# Patient Record
Sex: FEMALE | Race: WHITE | NOT HISPANIC OR LATINO | Employment: UNEMPLOYED | URBAN - METROPOLITAN AREA
[De-identification: names, ages, dates, MRNs, and addresses within clinical notes are randomized per-mention and may not be internally consistent; named-entity substitution may affect disease eponyms.]

---

## 2018-02-15 ENCOUNTER — OFFICE VISIT CONVERTED (OUTPATIENT)
Dept: ORTHOPEDIC SURGERY | Facility: CLINIC | Age: 28
End: 2018-02-15
Attending: ORTHOPAEDIC SURGERY

## 2019-01-10 ENCOUNTER — HOSPITAL ENCOUNTER (OUTPATIENT)
Dept: GENERAL RADIOLOGY | Facility: HOSPITAL | Age: 29
Discharge: HOME | End: 2019-01-10
Attending: NURSE PRACTITIONER

## 2021-05-12 ENCOUNTER — HOSPITAL ENCOUNTER (OUTPATIENT)
Dept: URGENT CARE | Facility: CLINIC | Age: 31
Discharge: HOME | End: 2021-05-12
Attending: FAMILY MEDICINE

## 2022-03-23 ENCOUNTER — TELEPHONE (OUTPATIENT)
Dept: OBSTETRICS AND GYNECOLOGY | Facility: CLINIC | Age: 32
End: 2022-03-23

## 2022-03-23 RX ORDER — DIPHENHYDRAMINE HYDROCHLORIDE 25 MG/1
CAPSULE ORAL
Qty: 90 TABLET | Refills: 4 | Status: SHIPPED | OUTPATIENT
Start: 2022-03-23 | End: 2022-08-01

## 2022-03-23 NOTE — TELEPHONE ENCOUNTER
Patient called requesting something for nausea.  Has RGOB 4-29-22. Per protocol okay'd refill on Pyridoxine & Doxylamine

## 2022-04-27 ENCOUNTER — TELEPHONE (OUTPATIENT)
Dept: OBSTETRICS AND GYNECOLOGY | Facility: CLINIC | Age: 32
End: 2022-04-27

## 2022-04-27 NOTE — TELEPHONE ENCOUNTER
Patient called this pm.  Left message wants to make an appointment.  Returned patient call.  No answer.  Patient already has RGOB appointment on [id number] with Severiano

## 2022-05-12 ENCOUNTER — INITIAL PRENATAL (OUTPATIENT)
Dept: OBSTETRICS AND GYNECOLOGY | Facility: CLINIC | Age: 32
End: 2022-05-12

## 2022-05-12 VITALS
DIASTOLIC BLOOD PRESSURE: 67 MMHG | SYSTOLIC BLOOD PRESSURE: 110 MMHG | WEIGHT: 132.4 LBS | HEIGHT: 66 IN | BODY MASS INDEX: 21.28 KG/M2

## 2022-05-12 DIAGNOSIS — O99.320 SUBOXONE MAINTENANCE TREATMENT COMPLICATING PREGNANCY, ANTEPARTUM: ICD-10-CM

## 2022-05-12 DIAGNOSIS — Z3A.18 18 WEEKS GESTATION OF PREGNANCY: ICD-10-CM

## 2022-05-12 DIAGNOSIS — F11.20 SUBOXONE MAINTENANCE TREATMENT COMPLICATING PREGNANCY, ANTEPARTUM: ICD-10-CM

## 2022-05-12 DIAGNOSIS — Z34.82 PRENATAL CARE, SUBSEQUENT PREGNANCY IN SECOND TRIMESTER: Primary | ICD-10-CM

## 2022-05-12 PROBLEM — M51.36 DEGENERATION OF LUMBAR INTERVERTEBRAL DISC: Status: ACTIVE | Noted: 2019-03-26

## 2022-05-12 PROBLEM — M45.9 ANKYLOSING SPONDYLITIS (HCC): Status: ACTIVE | Noted: 2019-03-26

## 2022-05-12 PROBLEM — M19.90 ARTHRITIS: Status: ACTIVE | Noted: 2022-05-12

## 2022-05-12 PROBLEM — M47.816 LUMBAR SPONDYLOSIS: Status: ACTIVE | Noted: 2019-03-26

## 2022-05-12 LAB
ABO GROUP BLD: NORMAL
B-HCG UR QL: POSITIVE
BACTERIA UR QL AUTO: ABNORMAL /HPF
BASOPHILS # BLD AUTO: 0.04 10*3/MM3 (ref 0–0.2)
BASOPHILS NFR BLD AUTO: 0.5 % (ref 0–1.5)
BILIRUB UR QL STRIP: NEGATIVE
BLD GP AB SCN SERPL QL: NEGATIVE
CLARITY UR: CLEAR
COLOR UR: YELLOW
DEPRECATED RDW RBC AUTO: 41.6 FL (ref 37–54)
EOSINOPHIL # BLD AUTO: 0.1 10*3/MM3 (ref 0–0.4)
EOSINOPHIL NFR BLD AUTO: 1.3 % (ref 0.3–6.2)
ERYTHROCYTE [DISTWIDTH] IN BLOOD BY AUTOMATED COUNT: 12.6 % (ref 12.3–15.4)
EXPIRATION DATE: ABNORMAL
GLUCOSE UR STRIP-MCNC: NEGATIVE MG/DL
GLUCOSE UR STRIP-MCNC: NEGATIVE MG/DL
HBV SURFACE AG SERPL QL IA: NORMAL
HCT VFR BLD AUTO: 36.2 % (ref 34–46.6)
HCV AB SER DONR QL: NORMAL
HGB BLD-MCNC: 12.2 G/DL (ref 12–15.9)
HGB UR QL STRIP.AUTO: NEGATIVE
HIV1+2 AB SER QL: NORMAL
HYALINE CASTS UR QL AUTO: ABNORMAL /LPF
IMM GRANULOCYTES # BLD AUTO: 0.02 10*3/MM3 (ref 0–0.05)
IMM GRANULOCYTES NFR BLD AUTO: 0.3 % (ref 0–0.5)
INTERNAL NEGATIVE CONTROL: NEGATIVE
INTERNAL POSITIVE CONTROL: ABNORMAL
KETONES UR QL STRIP: NEGATIVE
LEUKOCYTE EST, POC: NEGATIVE
LEUKOCYTE ESTERASE UR QL STRIP.AUTO: NEGATIVE
LYMPHOCYTES # BLD AUTO: 1.51 10*3/MM3 (ref 0.7–3.1)
LYMPHOCYTES NFR BLD AUTO: 19.8 % (ref 19.6–45.3)
Lab: ABNORMAL
MCH RBC QN AUTO: 30.2 PG (ref 26.6–33)
MCHC RBC AUTO-ENTMCNC: 33.7 G/DL (ref 31.5–35.7)
MCV RBC AUTO: 89.6 FL (ref 79–97)
MONOCYTES # BLD AUTO: 0.33 10*3/MM3 (ref 0.1–0.9)
MONOCYTES NFR BLD AUTO: 4.3 % (ref 5–12)
NEUTROPHILS NFR BLD AUTO: 5.64 10*3/MM3 (ref 1.7–7)
NEUTROPHILS NFR BLD AUTO: 73.8 % (ref 42.7–76)
NITRITE UR QL STRIP: NEGATIVE
NITRITE UR-MCNC: NEGATIVE MG/ML
NRBC BLD AUTO-RTO: 0 /100 WBC (ref 0–0.2)
PH UR STRIP.AUTO: 8.5 [PH] (ref 5–8)
PLATELET # BLD AUTO: 210 10*3/MM3 (ref 140–450)
PMV BLD AUTO: 10.7 FL (ref 6–12)
PROT UR QL STRIP: NEGATIVE
PROT UR STRIP-MCNC: NEGATIVE MG/DL
RBC # BLD AUTO: 4.04 10*6/MM3 (ref 3.77–5.28)
RBC # UR STRIP: ABNORMAL /HPF
REF LAB TEST METHOD: ABNORMAL
RH BLD: NEGATIVE
RPR SER QL: NORMAL
SP GR UR STRIP: 1.02 (ref 1–1.03)
SQUAMOUS #/AREA URNS HPF: ABNORMAL /HPF
UROBILINOGEN UR QL STRIP: ABNORMAL
WBC # UR STRIP: ABNORMAL /HPF
WBC NRBC COR # BLD: 7.64 10*3/MM3 (ref 3.4–10.8)

## 2022-05-12 PROCEDURE — 87491 CHLMYD TRACH DNA AMP PROBE: CPT | Performed by: NURSE PRACTITIONER

## 2022-05-12 PROCEDURE — 83020 HEMOGLOBIN ELECTROPHORESIS: CPT | Performed by: NURSE PRACTITIONER

## 2022-05-12 PROCEDURE — 87624 HPV HI-RISK TYP POOLED RSLT: CPT | Performed by: NURSE PRACTITIONER

## 2022-05-12 PROCEDURE — 81001 URINALYSIS AUTO W/SCOPE: CPT | Performed by: NURSE PRACTITIONER

## 2022-05-12 PROCEDURE — 81220 CFTR GENE COM VARIANTS: CPT | Performed by: NURSE PRACTITIONER

## 2022-05-12 PROCEDURE — 81025 URINE PREGNANCY TEST: CPT | Performed by: NURSE PRACTITIONER

## 2022-05-12 PROCEDURE — 99214 OFFICE O/P EST MOD 30 MIN: CPT | Performed by: NURSE PRACTITIONER

## 2022-05-12 PROCEDURE — 87661 TRICHOMONAS VAGINALIS AMPLIF: CPT | Performed by: NURSE PRACTITIONER

## 2022-05-12 PROCEDURE — 86803 HEPATITIS C AB TEST: CPT | Performed by: NURSE PRACTITIONER

## 2022-05-12 PROCEDURE — 87086 URINE CULTURE/COLONY COUNT: CPT | Performed by: NURSE PRACTITIONER

## 2022-05-12 PROCEDURE — 87591 N.GONORRHOEAE DNA AMP PROB: CPT | Performed by: NURSE PRACTITIONER

## 2022-05-12 PROCEDURE — G0432 EIA HIV-1/HIV-2 SCREEN: HCPCS | Performed by: NURSE PRACTITIONER

## 2022-05-12 PROCEDURE — G0123 SCREEN CERV/VAG THIN LAYER: HCPCS | Performed by: NURSE PRACTITIONER

## 2022-05-12 RX ORDER — BUPRENORPHINE HYDROCHLORIDE AND NALOXONE HYDROCHLORIDE DIHYDRATE 8; 2 MG/1; MG/1
2 TABLET SUBLINGUAL DAILY
COMMUNITY
Start: 2022-05-11 | End: 2022-10-10 | Stop reason: HOSPADM

## 2022-05-12 NOTE — PROGRESS NOTES
"OB Initial Visit    CC- Here for care of current pregnancy, first visit    Subjective:  31 y.o.  presenting for her first obstetrical visit.    LMP: Patient's last menstrual period was 2021.     COMPLAINS OF: Pt has no complaints, is doing well        Reviewed and updated:  OBHx, GYNHx (STDs), PMHx, Medications, Allergies, PSHx, Social Hx, Preventative Hx (PAP), Hx of abuse/safe environment, Vaccine Hx including hx of chickenpox or vaccine, Genetic Hx (pt, FOB, both families).        Objective:  /67   Ht 167.6 cm (66\")   Wt 60.1 kg (132 lb 6.4 oz)   LMP 2021   BMI 21.37 kg/m²   General- NAD, alert and oriented, appropriate  Psych- Normal mood, good memory  Neck- No masses, no thyroid enlargement  CV- Regular rhythm, no murnurs  Resp- CTA to bases, no wheezes  Abdomen- Soft, non distended, non tender, no masses    Breast left- deferred  Breast right- deferred    External genitalia- Normal, no lesions  Urethra- Normal, no masses, non tender  Vagina- Normal, no discharge  Bladder- Normal, no masses, non tender  Cvx- Normal, no lesions, no discharge, no CMT  Uterus- Normal shape and consistency, non tender, Consistent with dates.    Adnexa- Normal, no mass, non tender    Lymphatic- No palpable neck, axillary, or groin nodes  Ext- No edema, no cyanosis    Skin- No lesions, no rashes, no acanthosis nigricans    Assessment and Plan:  18w6d  Diagnoses and all orders for this visit:    1. Prenatal care, subsequent pregnancy in second trimester (Primary)  Overview:  ANJUM finalized: 10/7/22 per LMP    Genetic testing:  Desires NIPS, CF, AFP    COVID: Recommended 22  Tdap:    Rhogam:  ?Sterilization:    Anatomy US:  FU US:    PROBLEM LIST/PLAN:   On suboxone therapy         Orders:  -     POC Urinalysis Dipstick  -     POC Pregnancy, Urine  -     Hemoglobinopathy Fractionation Cascade  -     IGP,CtNgTv,Apt HPV,rfx 16 / 18,45  -     OB Panel With HIV  -     Urinalysis With Microscopic - Urine, " Clean Catch  -     Urine Culture - Urine, Urine, Random Void  -     Cystic Fibrosis Diagnostic Study  -     Cancel: Alpha Fetoprotein, Maternal  -     INVITAE NIPS  -     US Ob Detail Fetal Anatomy Single or First Gestation; Future  -     Alpha Fetoprotein, Maternal; Future    2. 18 weeks gestation of pregnancy    3. Suboxone maintenance treatment complicating pregnancy, antepartum (HCC)  Overview:  Currently on suboxone - advised to continue current treatment        Genetic Screening:   • CF  • NIPS  • AFP only    Vaccines:   • Recommend COVID vaccine, R/B discussed    Counseling:   • Nutrition discussed, calories, activity/exercise in pregnancy  • Discussed dietary restrictions/safety food preparation in pregnancy  • Reviewed what to expect prenatal visits, office providers  • Appropriate trimester precautions provided, N/V, vag bleeding, cramping  • Questions answered    Labs:   • Prenatal labs, cultures, and PAP performed (prn), NIPS, CF, AFP    Return in about 4 weeks (around 6/9/2022) for [facility] Office, OB follow up.      Jacques Wren, APRN  05/12/2022    [facility] [address]  [facility]  [address] [address]  Dept: [phone]  Loc: [phone]

## 2022-05-14 LAB
BACTERIA SPEC AEROBE CULT: ABNORMAL
RUBV IGG SERPL IA-ACNC: <0.9 INDEX

## 2022-05-16 DIAGNOSIS — O09.899 RUBELLA NON-IMMUNE STATUS, ANTEPARTUM: Primary | ICD-10-CM

## 2022-05-16 DIAGNOSIS — O23.40 URINARY TRACT INFECTION IN MOTHER DURING PREGNANCY, ANTEPARTUM: ICD-10-CM

## 2022-05-16 DIAGNOSIS — Z28.39 RUBELLA NON-IMMUNE STATUS, ANTEPARTUM: Primary | ICD-10-CM

## 2022-05-16 LAB
HGB A MFR BLD ELPH: 97.2 % (ref 96.4–98.8)
HGB A2 MFR BLD ELPH: 2.8 % (ref 1.8–3.2)
HGB F MFR BLD ELPH: 0 % (ref 0–2)
HGB FRACT BLD-IMP: NORMAL
HGB S MFR BLD ELPH: 0 %

## 2022-05-16 RX ORDER — AMPICILLIN 500 MG/1
500 CAPSULE ORAL 4 TIMES DAILY
Qty: 28 CAPSULE | Refills: 0 | Status: SHIPPED | OUTPATIENT
Start: 2022-05-16 | End: 2022-05-23

## 2022-05-18 ENCOUNTER — HOSPITAL ENCOUNTER (OUTPATIENT)
Dept: ULTRASOUND IMAGING | Facility: HOSPITAL | Age: 32
Discharge: HOME | End: 2022-05-18
Admitting: NURSE PRACTITIONER

## 2022-05-18 DIAGNOSIS — Z34.82 PRENATAL CARE, SUBSEQUENT PREGNANCY IN SECOND TRIMESTER: ICD-10-CM

## 2022-05-18 LAB
C TRACH RRNA CVX QL NAA+PROBE: NEGATIVE
CYTOLOGIST CVX/VAG CYTO: NORMAL
CYTOLOGY CVX/VAG DOC CYTO: NORMAL
CYTOLOGY CVX/VAG DOC THIN PREP: NORMAL
DX ICD CODE: NORMAL
HIV 1 & 2 AB SER-IMP: NORMAL
HPV I/H RISK 4 DNA CVX QL PROBE+SIG AMP: NEGATIVE
N GONORRHOEA RRNA CVX QL NAA+PROBE: NEGATIVE
OTHER STN SPEC: NORMAL
STAT OF ADQ CVX/VAG CYTO-IMP: NORMAL
T VAGINALIS RRNA SPEC QL NAA+PROBE: NEGATIVE

## 2022-05-18 PROCEDURE — 76811 OB US DETAILED SNGL FETUS: CPT

## 2022-05-19 ENCOUNTER — TELEPHONE (OUTPATIENT)
Dept: OBSTETRICS AND GYNECOLOGY | Facility: CLINIC | Age: 32
End: 2022-05-19

## 2022-05-19 LAB
CFTR MUT ANL BLD/T: NORMAL
LABORATORY COMMENT REPORT: NORMAL

## 2022-05-24 ENCOUNTER — REFERRAL TRIAGE (OUTPATIENT)
Dept: LABOR AND DELIVERY | Facility: HOSPITAL | Age: 32
End: 2022-05-24

## 2022-05-24 ENCOUNTER — PATIENT OUTREACH (OUTPATIENT)
Dept: LABOR AND DELIVERY | Facility: HOSPITAL | Age: 32
End: 2022-05-24

## 2022-05-24 NOTE — OUTREACH NOTE
[facility]  Enrollment    Current Estimated Gestational Age: 20w4d    Questions/Answers    Flowsheet Row Responses   Would like to participate? No          Sara Barton RN  Maternity Nurse Navigator    5/24/2022, 12:37 EDT

## 2022-06-21 ENCOUNTER — ROUTINE PRENATAL (OUTPATIENT)
Dept: OBSTETRICS AND GYNECOLOGY | Facility: CLINIC | Age: 32
End: 2022-06-21

## 2022-06-21 VITALS — BODY MASS INDEX: 21.79 KG/M2 | SYSTOLIC BLOOD PRESSURE: 106 MMHG | WEIGHT: 135 LBS | DIASTOLIC BLOOD PRESSURE: 69 MMHG

## 2022-06-21 DIAGNOSIS — Z3A.24 24 WEEKS GESTATION OF PREGNANCY: ICD-10-CM

## 2022-06-21 DIAGNOSIS — F11.20 SUBOXONE MAINTENANCE TREATMENT COMPLICATING PREGNANCY, ANTEPARTUM: ICD-10-CM

## 2022-06-21 DIAGNOSIS — Z28.39 RUBELLA NON-IMMUNE STATUS, ANTEPARTUM: ICD-10-CM

## 2022-06-21 DIAGNOSIS — O99.320 SUBOXONE MAINTENANCE TREATMENT COMPLICATING PREGNANCY, ANTEPARTUM: ICD-10-CM

## 2022-06-21 DIAGNOSIS — Z34.82 PRENATAL CARE, SUBSEQUENT PREGNANCY IN SECOND TRIMESTER: Primary | ICD-10-CM

## 2022-06-21 DIAGNOSIS — O09.899 RUBELLA NON-IMMUNE STATUS, ANTEPARTUM: ICD-10-CM

## 2022-06-21 LAB
CLARITY, POC: CLEAR
COLOR UR: YELLOW
GLUCOSE UR STRIP-MCNC: NEGATIVE MG/DL
LEUKOCYTE EST, POC: NEGATIVE
NITRITE UR-MCNC: NEGATIVE MG/ML
PROT UR STRIP-MCNC: NEGATIVE MG/DL

## 2022-06-21 PROCEDURE — 99213 OFFICE O/P EST LOW 20 MIN: CPT | Performed by: NURSE PRACTITIONER

## 2022-06-21 RX ORDER — MIRTAZAPINE 15 MG/1
TABLET, FILM COATED ORAL
COMMUNITY
Start: 2022-06-15 | End: 2022-08-01

## 2022-06-21 NOTE — ASSESSMENT & PLAN NOTE
Doing well, no complaints.  States only wants to see a female provider, explained our practice is unable to guarantee a female provider for her care.  Patient will explore transferring care, contact information for two all-female OB/GYN practices in [place] provided.

## 2022-06-21 NOTE — PROGRESS NOTES
OB FOLLOW UP        Chief Complaint   Patient presents with   • Routine Prenatal Visit       Subjective:   • No complaints   • Only wants a female provider    Objective:  /69   Wt 61.2 kg (135 lb)   LMP 12/31/2021   BMI 21.79 kg/m²   Uterine Size: size equals dates  FHT: 110-160 BPM    See OB flow for LE edema, cvx exam if performed, and Upro/Uglu    Assessment and Plan:  24w4d  Reassuring pregnancy progress.  Questions answered.  Diagnoses and all orders for this visit:    1. Prenatal care, subsequent pregnancy in second trimester (Primary)  Overview:  ANJUM finalized: 10/7/22 per LMP, 19-week US and ACOG    Genetic testing:  CF - negative, NIPS negative, missed AFP window    COVID: Recommended 5/12/22  Tdap:    Rhogam:  ?Sterilization:    Anatomy US: 5/18/22 normal anatomy, anterior placenta  FU US:    PROBLEM LIST/PLAN:   On suboxone therapy         Assessment & Plan:  Doing well, no complaints.  States only wants to see a female provider, explained our practice is unable to guarantee a female provider for her care.  Patient will explore transferring care, contact information for two all-female OB/GYN practices in [place] provided.     Orders:  -     POC Urinalysis Dipstick    2. 24 weeks gestation of pregnancy    3. Suboxone maintenance treatment complicating pregnancy, antepartum (HCC)  Overview:  Currently on suboxone - advised to continue current treatment      4. Rubella non-immune status, antepartum    Counseling:    • Second trimester precautions  • OB precautions, leaking, VB, viet jackson vs PTL/Labor  • FKC  • Patient to explore transferring care  • Continue PNV.  Importance of healthy eating and exercise.    Return in about 3 weeks (around 7/12/2022) for OB follow up, 1hGTT.            Jacques Wren, APRN  06/21/2022    [facility] [address]  [facility]  [address] [address]  Dept: [phone]  Loc: [phone]

## 2022-07-18 PROBLEM — O26.899 RH NEGATIVE, ANTEPARTUM: Status: ACTIVE | Noted: 2022-07-18

## 2022-07-18 PROBLEM — Z67.91 RH NEGATIVE, ANTEPARTUM: Status: ACTIVE | Noted: 2022-07-18

## 2022-07-20 ENCOUNTER — ROUTINE PRENATAL (OUTPATIENT)
Dept: OBSTETRICS AND GYNECOLOGY | Facility: CLINIC | Age: 32
End: 2022-07-20

## 2022-07-20 VITALS — SYSTOLIC BLOOD PRESSURE: 102 MMHG | DIASTOLIC BLOOD PRESSURE: 64 MMHG | BODY MASS INDEX: 21.95 KG/M2 | WEIGHT: 136 LBS

## 2022-07-20 DIAGNOSIS — Z67.91 RH NEGATIVE, ANTEPARTUM: ICD-10-CM

## 2022-07-20 DIAGNOSIS — O99.320 SUBOXONE MAINTENANCE TREATMENT COMPLICATING PREGNANCY, ANTEPARTUM: ICD-10-CM

## 2022-07-20 DIAGNOSIS — F11.20 SUBOXONE MAINTENANCE TREATMENT COMPLICATING PREGNANCY, ANTEPARTUM: ICD-10-CM

## 2022-07-20 DIAGNOSIS — Z34.80 SUPERVISION OF OTHER NORMAL PREGNANCY, ANTEPARTUM: Primary | ICD-10-CM

## 2022-07-20 DIAGNOSIS — O26.899 RH NEGATIVE, ANTEPARTUM: ICD-10-CM

## 2022-07-20 LAB
DEPRECATED RDW RBC AUTO: 39.1 FL (ref 37–54)
ERYTHROCYTE [DISTWIDTH] IN BLOOD BY AUTOMATED COUNT: 12 % (ref 12.3–15.4)
GLUCOSE 1H P GLC SERPL-MCNC: 115 MG/DL (ref 65–139)
GLUCOSE UR STRIP-MCNC: NEGATIVE MG/DL
HCT VFR BLD AUTO: 32 % (ref 34–46.6)
HGB BLD-MCNC: 10.7 G/DL (ref 12–15.9)
MCH RBC QN AUTO: 29.9 PG (ref 26.6–33)
MCHC RBC AUTO-ENTMCNC: 33.4 G/DL (ref 31.5–35.7)
MCV RBC AUTO: 89.4 FL (ref 79–97)
PLATELET # BLD AUTO: 174 10*3/MM3 (ref 140–450)
PMV BLD AUTO: 9.3 FL (ref 6–12)
PROT UR STRIP-MCNC: NEGATIVE MG/DL
RBC # BLD AUTO: 3.58 10*6/MM3 (ref 3.77–5.28)
WBC NRBC COR # BLD: 5.67 10*3/MM3 (ref 3.4–10.8)

## 2022-07-20 PROCEDURE — 82950 GLUCOSE TEST: CPT | Performed by: OBSTETRICS & GYNECOLOGY

## 2022-07-20 PROCEDURE — 85027 COMPLETE CBC AUTOMATED: CPT | Performed by: OBSTETRICS & GYNECOLOGY

## 2022-07-20 PROCEDURE — 99213 OFFICE O/P EST LOW 20 MIN: CPT | Performed by: OBSTETRICS & GYNECOLOGY

## 2022-07-20 NOTE — PROGRESS NOTES
Chief Complaint:  Scheduled OB visit    HPI: 31 y.o.  at 28w5d   Positive baby movement  On Suboxone split dosage daily.  Currently 4 mg 2-3 times a day.  The tablet is an 8 mg tablet.    Vitals:    22 1037   BP: 102/64   Weight: 61.7 kg (136 lb)       See OB flowsheet also for pregnancy related data.    A/P  Intrauterine pregnancy at 28w5d   Diagnoses and all orders for this visit:    1. Supervision of other normal pregnancy, antepartum (Primary)  -     POC Urinalysis Dipstick  -     Gestational Diabetes Screen 1 Hour  -     CBC (No Diff)    2. Rh negative, antepartum  -     Ambulatory Referral to [facility] For Infusion Treatment  -      RhIg Evaluation  -     Doses of rh immune globulin    3. Suboxone maintenance treatment complicating pregnancy, antepartum (HCC)  Overview:  Currently on suboxone - advised to continue current treatment    Orders:  -     US Ob 14 + Weeks Single or First Gestation; Future      Continue prenatal vitamins.      PLAN:   Return in about 2 weeks (around 8/3/2022).    Sen Downey Sr., MD  2022 12:13 EDT

## 2022-07-21 ENCOUNTER — HOSPITAL ENCOUNTER (OUTPATIENT)
Dept: INFUSION THERAPY | Facility: HOSPITAL | Age: 32
Discharge: HOME | End: 2022-07-21

## 2022-07-21 ENCOUNTER — LAB (OUTPATIENT)
Dept: LAB | Facility: HOSPITAL | Age: 32
End: 2022-07-21

## 2022-07-21 VITALS
SYSTOLIC BLOOD PRESSURE: 106 MMHG | RESPIRATION RATE: 18 BRPM | WEIGHT: 138.01 LBS | DIASTOLIC BLOOD PRESSURE: 68 MMHG | BODY MASS INDEX: 25.4 KG/M2 | OXYGEN SATURATION: 99 % | HEIGHT: 62 IN | TEMPERATURE: 99 F | HEART RATE: 82 BPM

## 2022-07-21 DIAGNOSIS — O26.899 RH NEGATIVE, ANTEPARTUM: Primary | ICD-10-CM

## 2022-07-21 DIAGNOSIS — Z67.91 RH NEGATIVE, ANTEPARTUM: Primary | ICD-10-CM

## 2022-07-21 LAB
ABO GROUP BLD: NORMAL
BLD GP AB SCN SERPL QL: NEGATIVE
NUMBER OF DOSES: NORMAL
RH BLD: NEGATIVE

## 2022-07-21 PROCEDURE — 25010000002 RHO D IMMUNE GLOBULIN 1500 UNIT/2ML SOLUTION PREFILLED SYRINGE: Performed by: OBSTETRICS & GYNECOLOGY

## 2022-07-21 PROCEDURE — 86901 BLOOD TYPING SEROLOGIC RH(D): CPT | Performed by: OBSTETRICS & GYNECOLOGY

## 2022-07-21 PROCEDURE — 86900 BLOOD TYPING SEROLOGIC ABO: CPT | Performed by: OBSTETRICS & GYNECOLOGY

## 2022-07-21 PROCEDURE — 86850 RBC ANTIBODY SCREEN: CPT | Performed by: OBSTETRICS & GYNECOLOGY

## 2022-07-21 PROCEDURE — 96372 THER/PROPH/DIAG INJ SC/IM: CPT

## 2022-07-21 RX ADMIN — HUMAN RHO(D) IMMUNE GLOBULIN 1500 UNITS: 1500 SOLUTION INTRAMUSCULAR; INTRAVENOUS at 14:28

## 2022-07-21 NOTE — CODE DOCUMENTATION
Due 9/17/22     30 weeks 2 days  Per patient       AB negative per todays lab    Eugene chang    Patient tolerated well.  Denies s/s reaction  Site wnl    Eugene rn

## 2022-08-03 ENCOUNTER — ROUTINE PRENATAL (OUTPATIENT)
Dept: OBSTETRICS AND GYNECOLOGY | Facility: CLINIC | Age: 32
End: 2022-08-03

## 2022-08-03 VITALS — SYSTOLIC BLOOD PRESSURE: 108 MMHG | DIASTOLIC BLOOD PRESSURE: 70 MMHG | WEIGHT: 142 LBS | BODY MASS INDEX: 25.97 KG/M2

## 2022-08-03 DIAGNOSIS — Z34.80 SUPERVISION OF OTHER NORMAL PREGNANCY, ANTEPARTUM: Primary | ICD-10-CM

## 2022-08-03 LAB
GLUCOSE UR STRIP-MCNC: NEGATIVE MG/DL
PROT UR STRIP-MCNC: NEGATIVE MG/DL

## 2022-08-03 PROCEDURE — 99213 OFFICE O/P EST LOW 20 MIN: CPT | Performed by: OBSTETRICS & GYNECOLOGY

## 2022-08-03 NOTE — PROGRESS NOTES
Chief Complaint:  Scheduled OB visit    HPI: 31 y.o.  at 32w1d   Positive baby movement  Suboxone twice daily as needed    Vitals:    22 1257   BP: 108/70   Weight: 64.4 kg (142 lb)     Ultrasound today notes 4 pounds 0 ounces.  25th percentile.  Predicts 6 pounds 11 ounces at 39th week    See OB flowsheet also for pregnancy related data.    A/P  Intrauterine pregnancy at 32w1d   Diagnoses and all orders for this visit:    1. Supervision of other normal pregnancy, antepartum (Primary)  -     POC Urinalysis Dipstick        Continue prenatal vitamins.  Encouraged fetal kick counts, 10 movements in 2 hours every day.  To labor and delivery if lack fetal movement    PLAN:   Return in about 2 weeks (around 2022).    Sen Downey Sr., MD  8/3/2022 13:14 EDT

## 2022-08-08 ENCOUNTER — TELEPHONE (OUTPATIENT)
Dept: OBSTETRICS AND GYNECOLOGY | Facility: CLINIC | Age: 32
End: 2022-08-08

## 2022-08-08 DIAGNOSIS — Z3A.32 32 WEEKS GESTATION OF PREGNANCY: Primary | ICD-10-CM

## 2022-08-08 DIAGNOSIS — Z3A.27 27 WEEKS GESTATION OF PREGNANCY: ICD-10-CM

## 2022-08-08 RX ORDER — PNV NO.95/FERROUS FUM/FOLIC AC 28MG-0.8MG
1 TABLET ORAL DAILY
Qty: 30 TABLET | Refills: 1 | Status: SHIPPED | OUTPATIENT
Start: 2022-08-08

## 2022-08-17 ENCOUNTER — ROUTINE PRENATAL (OUTPATIENT)
Dept: OBSTETRICS AND GYNECOLOGY | Facility: CLINIC | Age: 32
End: 2022-08-17

## 2022-08-17 VITALS — SYSTOLIC BLOOD PRESSURE: 103 MMHG | BODY MASS INDEX: 26.52 KG/M2 | DIASTOLIC BLOOD PRESSURE: 69 MMHG | WEIGHT: 145 LBS

## 2022-08-17 DIAGNOSIS — Z34.80 SUPERVISION OF OTHER NORMAL PREGNANCY, ANTEPARTUM: Primary | ICD-10-CM

## 2022-08-17 DIAGNOSIS — O99.320 SUBOXONE MAINTENANCE TREATMENT COMPLICATING PREGNANCY, ANTEPARTUM: ICD-10-CM

## 2022-08-17 DIAGNOSIS — F11.20 SUBOXONE MAINTENANCE TREATMENT COMPLICATING PREGNANCY, ANTEPARTUM: ICD-10-CM

## 2022-08-17 LAB
GLUCOSE UR STRIP-MCNC: NEGATIVE MG/DL
PROT UR STRIP-MCNC: NEGATIVE MG/DL

## 2022-08-17 PROCEDURE — 99213 OFFICE O/P EST LOW 20 MIN: CPT | Performed by: OBSTETRICS & GYNECOLOGY

## 2022-08-17 NOTE — PROGRESS NOTES
Chief Complaint:  Scheduled OB visit    HPI: 31 y.o.  at 34w1d   Positive baby movement  Patient has 30-day prescription for Suboxone.  Will bring to the hospital for verification.    Vitals:    22 0904   BP: 103/69   Weight: 65.8 kg (145 lb)       See OB flowsheet also for pregnancy related data.    A/P  Intrauterine pregnancy at 34w1d   Diagnoses and all orders for this visit:    1. Supervision of other normal pregnancy, antepartum (Primary)  -     POC Urinalysis Dipstick    2. Suboxone maintenance treatment complicating pregnancy, antepartum (HCC)  Overview:  Currently on suboxone - advised to continue current treatment    Continue Suboxone twice daily.    Continue prenatal vitamins.  Encouraged fetal kick counts, 10 movements in 2 hours every day.  To labor and delivery if lack fetal movement    PLAN:   Return in about 2 weeks (around 2022).    Sen Downey Sr., MD  2022 09:34 EDT

## 2022-09-01 ENCOUNTER — ROUTINE PRENATAL (OUTPATIENT)
Dept: OBSTETRICS AND GYNECOLOGY | Facility: CLINIC | Age: 32
End: 2022-09-01

## 2022-09-01 VITALS — BODY MASS INDEX: 26.3 KG/M2 | DIASTOLIC BLOOD PRESSURE: 74 MMHG | SYSTOLIC BLOOD PRESSURE: 120 MMHG | WEIGHT: 143.8 LBS

## 2022-09-01 DIAGNOSIS — F11.20 SUBOXONE MAINTENANCE TREATMENT COMPLICATING PREGNANCY, ANTEPARTUM: ICD-10-CM

## 2022-09-01 DIAGNOSIS — O99.320 SUBOXONE MAINTENANCE TREATMENT COMPLICATING PREGNANCY, ANTEPARTUM: ICD-10-CM

## 2022-09-01 DIAGNOSIS — Z34.80 SUPERVISION OF OTHER NORMAL PREGNANCY, ANTEPARTUM: Primary | ICD-10-CM

## 2022-09-01 LAB
GLUCOSE UR STRIP-MCNC: NEGATIVE MG/DL
PROT UR STRIP-MCNC: NEGATIVE MG/DL

## 2022-09-01 PROCEDURE — 87081 CULTURE SCREEN ONLY: CPT | Performed by: OBSTETRICS & GYNECOLOGY

## 2022-09-01 PROCEDURE — 99213 OFFICE O/P EST LOW 20 MIN: CPT | Performed by: OBSTETRICS & GYNECOLOGY

## 2022-09-01 NOTE — PROGRESS NOTES
Chief Complaint:  Scheduled OB visit    HPI: 31 y.o.  at 36w2d   Positive baby movement  Patient has 30-day Suboxone subscription.  Will bring to hospital.    Vitals:    22 0955   BP: 120/74   Weight: 65.2 kg (143 lb 12.8 oz)     Cervix closed.  Vertex presentation  See OB flowsheet also for pregnancy related data.    A/P  Intrauterine pregnancy at 36w2d   Diagnoses and all orders for this visit:    1. Supervision of other normal pregnancy, antepartum (Primary)  -     POC Urinalysis Dipstick    2. Suboxone maintenance treatment complicating pregnancy, antepartum (HCC)  Overview:  Currently on suboxone - advised to continue current treatment        Continue prenatal vitamins.  Encouraged fetal kick counts, 10 movements in 2 hours every day.  To labor and delivery if lack fetal movement  Routine labor warnings were discussed and indications for L & D f/u including bleeding, regular contractions, decreased fetal movement or/and rupture of membranes.     PLAN:   Return in about 1 week (around 2022).    Sen Downey Sr., MD  2022 10:08 EDT

## 2022-09-03 LAB — BACTERIA SPEC AEROBE CULT: NORMAL

## 2022-09-08 ENCOUNTER — ROUTINE PRENATAL (OUTPATIENT)
Dept: OBSTETRICS AND GYNECOLOGY | Facility: CLINIC | Age: 32
End: 2022-09-08

## 2022-09-08 VITALS — DIASTOLIC BLOOD PRESSURE: 70 MMHG | WEIGHT: 145 LBS | SYSTOLIC BLOOD PRESSURE: 110 MMHG | BODY MASS INDEX: 26.52 KG/M2

## 2022-09-08 DIAGNOSIS — O99.320 SUBOXONE MAINTENANCE TREATMENT COMPLICATING PREGNANCY, ANTEPARTUM: ICD-10-CM

## 2022-09-08 DIAGNOSIS — F11.20 SUBOXONE MAINTENANCE TREATMENT COMPLICATING PREGNANCY, ANTEPARTUM: ICD-10-CM

## 2022-09-08 DIAGNOSIS — Z34.80 SUPERVISION OF OTHER NORMAL PREGNANCY, ANTEPARTUM: Primary | ICD-10-CM

## 2022-09-08 LAB
GLUCOSE UR STRIP-MCNC: NEGATIVE MG/DL
PROT UR STRIP-MCNC: NEGATIVE MG/DL

## 2022-09-08 PROCEDURE — 99213 OFFICE O/P EST LOW 20 MIN: CPT | Performed by: OBSTETRICS & GYNECOLOGY

## 2022-09-08 NOTE — PROGRESS NOTES
Chief Complaint:  Scheduled OB visit    HPI: 31 y.o.  at 37w2d   Positive baby movement    Discussed induction at 39th week.  Plan to schedule.    Vitals:    22 1006   BP: 110/70   Weight: 65.8 kg (145 lb)       See OB flowsheet also for pregnancy related data.    A/P  Intrauterine pregnancy at 37w2d   Diagnoses and all orders for this visit:    1. Supervision of other normal pregnancy, antepartum (Primary)  -     POC Urinalysis Dipstick    2. Suboxone maintenance treatment complicating pregnancy, antepartum (HCC)  Overview:  Currently on suboxone - advised to continue current treatment        Continue prenatal vitamins.  Encouraged fetal kick counts, 10 movements in 2 hours every day.  To labor and delivery if lack fetal movement  GBS was negative    PLAN:   Return in about 1 week (around 9/15/2022).    Sen Downey Sr., MD  2022 10:34 EDT

## 2022-09-15 ENCOUNTER — ROUTINE PRENATAL (OUTPATIENT)
Dept: OBSTETRICS AND GYNECOLOGY | Facility: CLINIC | Age: 32
End: 2022-09-15

## 2022-09-15 VITALS — BODY MASS INDEX: 27.07 KG/M2 | DIASTOLIC BLOOD PRESSURE: 72 MMHG | SYSTOLIC BLOOD PRESSURE: 114 MMHG | WEIGHT: 148 LBS

## 2022-09-15 DIAGNOSIS — Z34.80 SUPERVISION OF OTHER NORMAL PREGNANCY, ANTEPARTUM: Primary | ICD-10-CM

## 2022-09-15 LAB
GLUCOSE UR STRIP-MCNC: NEGATIVE MG/DL
PROT UR STRIP-MCNC: NEGATIVE MG/DL

## 2022-09-15 PROCEDURE — 99213 OFFICE O/P EST LOW 20 MIN: CPT | Performed by: OBSTETRICS & GYNECOLOGY

## 2022-09-15 NOTE — PROGRESS NOTES
Chief Complaint:  Scheduled OB visit    HPI: 31 y.o.  at 38w2d   Patient does not desire induction at 39 weeks.  Wishes to wait on spontaneous labor.  No high blood pressure or diabetes that would be a medical indication to induce.  Patient reports excellent baby movement    Vitals:    09/15/22 1043   BP: 114/72   Weight: 67.1 kg (148 lb)       See OB flowsheet also for pregnancy related data.    A/P  Intrauterine pregnancy at 38w2d   Diagnoses and all orders for this visit:    1. Supervision of other normal pregnancy, antepartum (Primary)  -     POC Urinalysis Dipstick        Continue prenatal vitamins.  Encouraged fetal kick counts, 10 movements in 2 hours every day.  To labor and delivery if lack fetal movement  Routine labor warnings were discussed and indications for L & D f/u including bleeding, regular contractions, decreased fetal movement or/and rupture of membranes.    GBS negative    PLAN:   Return in about 1 week (around 2022).    Sen Downey Sr., MD  9/15/2022 11:05 EDT

## 2022-09-21 ENCOUNTER — ROUTINE PRENATAL (OUTPATIENT)
Dept: OBSTETRICS AND GYNECOLOGY | Facility: CLINIC | Age: 32
End: 2022-09-21

## 2022-09-21 VITALS — DIASTOLIC BLOOD PRESSURE: 64 MMHG | SYSTOLIC BLOOD PRESSURE: 108 MMHG | WEIGHT: 145 LBS | BODY MASS INDEX: 26.52 KG/M2

## 2022-09-21 DIAGNOSIS — O99.320 SUBOXONE MAINTENANCE TREATMENT COMPLICATING PREGNANCY, ANTEPARTUM: ICD-10-CM

## 2022-09-21 DIAGNOSIS — Z34.80 SUPERVISION OF OTHER NORMAL PREGNANCY, ANTEPARTUM: Primary | ICD-10-CM

## 2022-09-21 DIAGNOSIS — F11.20 SUBOXONE MAINTENANCE TREATMENT COMPLICATING PREGNANCY, ANTEPARTUM: ICD-10-CM

## 2022-09-21 LAB
GLUCOSE UR STRIP-MCNC: NEGATIVE MG/DL
PROT UR STRIP-MCNC: NEGATIVE MG/DL

## 2022-09-21 PROCEDURE — 99213 OFFICE O/P EST LOW 20 MIN: CPT | Performed by: OBSTETRICS & GYNECOLOGY

## 2022-09-21 NOTE — PROGRESS NOTES
Chief Complaint:  Scheduled OB visit    HPI: 31 y.o.  at 39w1d   Positive baby movement  Prefers spontaneous labor.  Has declined induction.    Vitals:    22 1559   BP: 108/64   Weight: 65.8 kg (145 lb)       See OB flowsheet also for pregnancy related data.    A/P  Intrauterine pregnancy at 39w1d   Diagnoses and all orders for this visit:    1. Supervision of other normal pregnancy, antepartum (Primary)  -     POC Urinalysis Dipstick    2. Suboxone maintenance treatment complicating pregnancy, antepartum (HCC)  Overview:  Currently on suboxone - advised to continue current treatment    Bring medicine to hospital please for dosing consistency    Continue prenatal vitamins.  Encouraged fetal kick counts, 10 movements in 2 hours every day.  To labor and delivery if lack fetal movement  Routine labor warnings were discussed and indications for L & D f/u including bleeding, regular contractions, decreased fetal movement or/and rupture of membranes.    GBS negative    PLAN:   Return in about 1 week (around 2022).    Sen Downey Sr., MD  2022 16:22 EDT

## 2022-09-28 ENCOUNTER — ROUTINE PRENATAL (OUTPATIENT)
Dept: OBSTETRICS AND GYNECOLOGY | Facility: CLINIC | Age: 32
End: 2022-09-28

## 2022-09-28 VITALS — DIASTOLIC BLOOD PRESSURE: 76 MMHG | SYSTOLIC BLOOD PRESSURE: 114 MMHG | BODY MASS INDEX: 26.92 KG/M2 | WEIGHT: 147.2 LBS

## 2022-09-28 DIAGNOSIS — Z3A.41 POST TERM PREGNANCY AT 41 WEEKS GESTATION: ICD-10-CM

## 2022-09-28 DIAGNOSIS — O48.0 POST TERM PREGNANCY AT 41 WEEKS GESTATION: ICD-10-CM

## 2022-09-28 DIAGNOSIS — Z34.80 SUPERVISION OF OTHER NORMAL PREGNANCY, ANTEPARTUM: Primary | ICD-10-CM

## 2022-09-28 LAB
GLUCOSE UR STRIP-MCNC: NEGATIVE MG/DL
PROT UR STRIP-MCNC: NEGATIVE MG/DL

## 2022-09-28 PROCEDURE — 99213 OFFICE O/P EST LOW 20 MIN: CPT | Performed by: OBSTETRICS & GYNECOLOGY

## 2022-09-28 NOTE — PROGRESS NOTES
Chief Complaint:  Scheduled OB visit    HPI: 31 y.o.  at 40w1d   Positive baby movement  Patient desires spontaneous labor.  Discussed increased risk of stillbirth if reach 42 weeks.  Fourfold increase at that time.  Discussed induction at 41.3 weeks on .  Need weekly NSTs now.    Vitals:    22 0910   BP: 114/76   Weight: 66.8 kg (147 lb 3.2 oz)       See OB flowsheet also for pregnancy related data.    A/P  Intrauterine pregnancy at 40w1d   Diagnoses and all orders for this visit:    1. Supervision of other normal pregnancy, antepartum (Primary)  -     POC Urinalysis Dipstick    2. Post term pregnancy at 41 weeks gestation  -     Fetal Nonstress Test; Standing        Continue prenatal vitamins.      PLAN:   Return in about 1 week (around 10/5/2022).    Sen Downey Sr., MD  2022 10:02 EDT

## 2022-09-29 ENCOUNTER — HOSPITAL ENCOUNTER (OUTPATIENT)
Facility: HOSPITAL | Age: 32
Discharge: HOME | End: 2022-09-29
Attending: OBSTETRICS & GYNECOLOGY | Admitting: OBSTETRICS & GYNECOLOGY

## 2022-09-29 VITALS
DIASTOLIC BLOOD PRESSURE: 75 MMHG | HEART RATE: 72 BPM | SYSTOLIC BLOOD PRESSURE: 113 MMHG | OXYGEN SATURATION: 100 % | RESPIRATION RATE: 18 BRPM

## 2022-09-29 PROCEDURE — G0463 HOSPITAL OUTPT CLINIC VISIT: HCPCS

## 2022-09-29 PROCEDURE — 59025 FETAL NON-STRESS TEST: CPT | Performed by: OBSTETRICS & GYNECOLOGY

## 2022-09-29 PROCEDURE — 59025 FETAL NON-STRESS TEST: CPT

## 2022-10-05 ENCOUNTER — ROUTINE PRENATAL (OUTPATIENT)
Dept: OBSTETRICS AND GYNECOLOGY | Facility: CLINIC | Age: 32
End: 2022-10-05

## 2022-10-05 ENCOUNTER — PREP FOR SURGERY (OUTPATIENT)
Dept: OTHER | Facility: HOSPITAL | Age: 32
End: 2022-10-05

## 2022-10-05 VITALS — DIASTOLIC BLOOD PRESSURE: 68 MMHG | WEIGHT: 150 LBS | BODY MASS INDEX: 27.44 KG/M2 | SYSTOLIC BLOOD PRESSURE: 110 MMHG

## 2022-10-05 DIAGNOSIS — O48.0 POST TERM PREGNANCY AT 41 WEEKS GESTATION: ICD-10-CM

## 2022-10-05 DIAGNOSIS — Z3A.41 POST TERM PREGNANCY AT 41 WEEKS GESTATION: ICD-10-CM

## 2022-10-05 DIAGNOSIS — Z34.80 SUPERVISION OF OTHER NORMAL PREGNANCY, ANTEPARTUM: Primary | ICD-10-CM

## 2022-10-05 LAB
GLUCOSE UR STRIP-MCNC: NEGATIVE MG/DL
PROT UR STRIP-MCNC: NEGATIVE MG/DL

## 2022-10-05 PROCEDURE — 99213 OFFICE O/P EST LOW 20 MIN: CPT | Performed by: OBSTETRICS & GYNECOLOGY

## 2022-10-05 RX ORDER — FAMOTIDINE 10 MG/ML
20 INJECTION, SOLUTION INTRAVENOUS AS NEEDED
Status: CANCELLED | OUTPATIENT
Start: 2022-10-05

## 2022-10-05 RX ORDER — OXYTOCIN 10 [USP'U]/ML
10 INJECTION, SOLUTION INTRAMUSCULAR; INTRAVENOUS ONCE
Status: CANCELLED | OUTPATIENT
Start: 2022-10-05 | End: 2022-10-05

## 2022-10-05 RX ORDER — SODIUM CHLORIDE 0.9 % (FLUSH) 0.9 %
10 SYRINGE (ML) INJECTION AS NEEDED
Status: CANCELLED | OUTPATIENT
Start: 2022-10-05

## 2022-10-05 RX ORDER — ACETAMINOPHEN 325 MG/1
650 TABLET ORAL EVERY 6 HOURS PRN
Status: CANCELLED | OUTPATIENT
Start: 2022-10-05

## 2022-10-05 RX ORDER — SODIUM CHLORIDE 0.9 % (FLUSH) 0.9 %
3 SYRINGE (ML) INJECTION EVERY 12 HOURS SCHEDULED
Status: CANCELLED | OUTPATIENT
Start: 2022-10-05

## 2022-10-05 RX ORDER — MISOPROSTOL 200 UG/1
200 TABLET ORAL
Status: CANCELLED | OUTPATIENT
Start: 2022-10-05 | End: 2022-10-06

## 2022-10-05 RX ORDER — LIDOCAINE HYDROCHLORIDE 10 MG/ML
30 INJECTION, SOLUTION INFILTRATION; PERINEURAL AS NEEDED
Status: CANCELLED | OUTPATIENT
Start: 2022-10-05

## 2022-10-05 RX ORDER — TERBUTALINE SULFATE 1 MG/ML
0.25 INJECTION, SOLUTION SUBCUTANEOUS AS NEEDED
Status: CANCELLED | OUTPATIENT
Start: 2022-10-05

## 2022-10-05 RX ORDER — SODIUM CHLORIDE, SODIUM LACTATE, POTASSIUM CHLORIDE, CALCIUM CHLORIDE 600; 310; 30; 20 MG/100ML; MG/100ML; MG/100ML; MG/100ML
125 INJECTION, SOLUTION INTRAVENOUS CONTINUOUS
Status: CANCELLED | OUTPATIENT
Start: 2022-10-05

## 2022-10-05 RX ORDER — LIDOCAINE HYDROCHLORIDE 10 MG/ML
5 INJECTION, SOLUTION EPIDURAL; INFILTRATION; INTRACAUDAL; PERINEURAL AS NEEDED
Status: CANCELLED | OUTPATIENT
Start: 2022-10-05

## 2022-10-05 RX ORDER — ONDANSETRON 2 MG/ML
4 INJECTION INTRAMUSCULAR; INTRAVENOUS EVERY 6 HOURS PRN
Status: CANCELLED | OUTPATIENT
Start: 2022-10-05

## 2022-10-05 RX ORDER — TRISODIUM CITRATE DIHYDRATE AND CITRIC ACID MONOHYDRATE 500; 334 MG/5ML; MG/5ML
30 SOLUTION ORAL ONCE AS NEEDED
Status: CANCELLED | OUTPATIENT
Start: 2022-10-05

## 2022-10-05 RX ORDER — METHYLERGONOVINE MALEATE 0.2 MG/ML
200 INJECTION INTRAVENOUS ONCE AS NEEDED
Status: CANCELLED | OUTPATIENT
Start: 2022-10-05

## 2022-10-05 RX ORDER — IBUPROFEN 600 MG/1
600 TABLET ORAL EVERY 6 HOURS SCHEDULED
Status: CANCELLED | OUTPATIENT
Start: 2022-10-05

## 2022-10-05 RX ORDER — MISOPROSTOL 200 UG/1
200 TABLET ORAL ONCE
Status: CANCELLED | OUTPATIENT
Start: 2022-10-05 | End: 2022-10-05

## 2022-10-05 RX ORDER — ONDANSETRON 4 MG/1
4 TABLET, FILM COATED ORAL EVERY 6 HOURS PRN
Status: CANCELLED | OUTPATIENT
Start: 2022-10-05

## 2022-10-05 RX ORDER — FAMOTIDINE 20 MG/1
20 TABLET, FILM COATED ORAL AS NEEDED
Status: CANCELLED | OUTPATIENT
Start: 2022-10-05

## 2022-10-05 RX ORDER — MISOPROSTOL 100 MCG
50 TABLET ORAL ONCE
Status: CANCELLED | OUTPATIENT
Start: 2022-10-05 | End: 2022-10-05

## 2022-10-05 RX ORDER — CARBOPROST TROMETHAMINE 250 UG/ML
250 INJECTION, SOLUTION INTRAMUSCULAR AS NEEDED
Status: CANCELLED | OUTPATIENT
Start: 2022-10-05

## 2022-10-05 NOTE — H&P (VIEW-ONLY)
Obstetric History and Physical    Chief complaint, induction at postdates    Subjective     Patient is a 31 y.o. female  currently at 41w3d,    Patient agrees to postdates induction.  She is desired not to be induced and has waited until 41+ weeks.  Risk of stillbirth had been discussed.  Cervix is still unfavorable, closed in the office.  Plan Cytotec 50 mcg orally.  Anticipate vaginal delivery.  Estimated fetal weight 6-1/2 to 7 pounds  Patient has been on Suboxone and I have requested that she bring her medicine to the hospital for verification.    Prenatal Information:  Prenatal Results     POC Urine Glucose/Protein     Test Value Reference Range Date Time    Urine Glucose  Negative mg/dL Negative 10/05/22 1107    Urine Protein  Negative mg/dL Negative 10/05/22 1107          Initial Prenatal Labs     Test Value Reference Range Date Time    Hemoglobin  12.2 g/dL 12.0 - 15.9 22 1136    Hematocrit  36.2 % 34.0 - 46.6 22 1136    Platelets  210 10*3/mm3 140 - 450 22 1136    Rubella IgG  <0.90 index Immune >0.99 22 1136    Hepatitis B SAg  Non-Reactive  Non-Reactive 22 1136    Hepatitis C Ab  Non-Reactive  Non-Reactive 22 1136    RPR  Non-Reactive  Non-Reactive 22 1136    ABO  AB   22 1018    Rh  Negative   22 1018    Antibody Screen  Negative   22 1136    HIV  Non-Reactive  Non-Reactive 22 1136    Urine Culture  >100,000 CFU/mL Lactobacillus species   22 1125    Gonorrhea  Negative  Negative 22 1125    Chlamydia  Negative  Negative 22 1125    TSH        HgB A1c               2nd and 3rd Trimester     Test Value Reference Range Date Time    Hemoglobin (repeated)  10.7 g/dL 12.0 - 15.9 22 1135    Hematocrit (repeated)  32.0 % 34.0 - 46.6 22 1135    Platelets   174 10*3/mm3 140 - 450 22 1135       210 10*3/mm3 140 - 450 22 1136    GCT  115 mg/dL 65 - 139 22 1135    Antibody Screen (repeated)   Negative   07/21/22 1018    GTT Fasting        GTT 1 Hr        GTT 2 Hr        GTT 3 Hr        Group B Strep  No Group B Streptococcus isolated   09/01/22 1013          Drug Screening     Test Value Reference Range Date Time    Amphetamine Screen        Barbiturate Screen        Benzodiazepine Screen        Methadone Screen        Phencyclidine Screen        Opiates Screen        THC Screen        Cocaine Screen        Propoxyphene Screen        Buprenorphine Screen        Methamphetamine Screen        Oxycodone Screen        Tricyclic Antidepressants Screen              Other (Risk screening)     Test Value Reference Range Date Time    Varicella IgG        Parvovirus IgG        CMV IgG        Cystic Fibrosis        Hemoglobin electrophoresis        NIPT        MSAFP-4        AFP (for NTD only)              Legend    ^: Historical                      External Prenatal Results     Pregnancy Outside Results - Transcribed From Office Records - See Scanned Records For Details     Test Value Date Time    ABO  AB  07/21/22 1018    Rh  Negative  07/21/22 1018    Antibody Screen  Negative  07/21/22 1018       Negative  05/12/22 1136    Varicella IgG       Rubella  <0.90 index 05/12/22 1136    Hgb  10.7 g/dL 07/20/22 1135       12.2 g/dL 05/12/22 1136    Hct  32.0 % 07/20/22 1135       36.2 % 05/12/22 1136    Glucose Fasting GTT       Glucose Tolerance Test 1 hour       Glucose Tolerance Test 3 hour       Gonorrhea (discrete)  Negative  05/12/22 1125    Chlamydia (discrete)  Negative  05/12/22 1125    RPR  Non-Reactive  05/12/22 1136    VDRL       Syphilis Antibody       HBsAg  Non-Reactive  05/12/22 1136    Herpes Simplex Virus PCR       Herpes Simplex VIrus Culture       HIV  Non-Reactive  05/12/22 1136    Hep C RNA Quant PCR       Hep C Antibody  Non-Reactive  05/12/22 1136    AFP       Group B Strep  No Group B Streptococcus isolated  09/01/22 1013    GBS Susceptibility to Clindamycin       GBS Susceptibility to  Erythromycin       Fetal Fibronectin       Genetic Testing, Maternal Blood             Drug Screening     Test Value Date Time    Urine Drug Screen       Amphetamine Screen       Barbiturate Screen       Benzodiazepine Screen       Methadone Screen       Phencyclidine Screen       Opiates Screen       THC Screen       Cocaine Screen       Propoxyphene Screen       Buprenorphine Screen       Methamphetamine Screen       Oxycodone Screen       Tricyclic Antidepressants Screen             Legend    ^: Historical                         Past OB History:     OB History    Para Term  AB Living   2 1 1 0 0 1   SAB IAB Ectopic Molar Multiple Live Births   0 0 0 0 0 1      # Outcome Date GA Lbr Amador/2nd Weight Sex Delivery Anes PTL Lv   2 Current            1 Term 14 40w0d  3402 g (7 lb 8 oz) F Vag-Spont  N CLARKE       Past Medical History: No past medical history on file.   Past Surgical History No past surgical history on file.   Family History: Family History   Problem Relation Age of Onset   • Breast cancer Other       Social History:  reports that she has never smoked. She has never used smokeless tobacco.   reports no history of alcohol use.        General ROS: No chest pain or shortness of air  Positive baby movement.  Irregular contractions.    Objective       Vital Signs Range for the last 24 hours  Temperature:     Temp Source:     BP: BP: (110)/(68) 110/68   Pulse:     Respirations:     SPO2:     O2 Amount (l/min):     O2 Devices     Weight: Weight:  [68 kg (150 lb)] 68 kg (150 lb)     Physical Examination: General appearance - alert, well appearing, and in no distress  Chest -unlabored breathing  Heart - normal rate and regular rhythm  Abdomen -gravid, fundus nontender  Cervix closed, posterior.  Presentation:  Vertex       Assessment:  Intrauterine pregnancy at 41w3d gestation   Postdates induction  GBS negative     GBS status:   Group B Strep Culture   Date Value Ref Range Status   2022  No Group B Streptococcus isolated  Final       Plan:  Admit.  50 mcg Cytotec orally  Anticipate vaginal delivery weighing approximately 6 and 7:30 pounds.  Plan of care has been reviewed with patient.  Risks, benefits of treatment plan have been discussed.  All questions have been answered.         Sen Downey Sr., MD  10/5/2022  17:18 EDT

## 2022-10-05 NOTE — PROGRESS NOTES
Chief Complaint:  Scheduled OB visit    HPI: 31 y.o.  at 41w1d   Positive baby movement  Induction scheduled in 48 hours at 41 weeks 3 days.  Cervix posterior, unfavorable.  Vertex presentation.  Discussed plan p.o. Cytotec 50 mcg.    Vitals:    10/05/22 1107   BP: 110/68   Weight: 68 kg (150 lb)       See OB flowsheet also for pregnancy related data.    A/P  Intrauterine pregnancy at 41w1d   Diagnoses and all orders for this visit:    1. Supervision of other normal pregnancy, antepartum (Primary)  -     POC Urinalysis Dipstick    2. Post term pregnancy at 41 weeks gestation        Continue prenatal vitamins.  Encouraged fetal kick counts, 10 movements in 2 hours every day.  To labor and delivery if lack fetal movement    PLAN:   Induction on Friday.    Sen Downey Sr., MD  10/5/2022 11:24 EDT

## 2022-10-07 ENCOUNTER — HOSPITAL ENCOUNTER (OUTPATIENT)
Dept: LABOR AND DELIVERY | Facility: HOSPITAL | Age: 32
Discharge: HOME | End: 2022-10-07

## 2022-10-07 ENCOUNTER — HOSPITAL ENCOUNTER (INPATIENT)
Facility: HOSPITAL | Age: 32
LOS: 3 days | Discharge: HOME | End: 2022-10-10
Attending: OBSTETRICS & GYNECOLOGY | Admitting: OBSTETRICS & GYNECOLOGY

## 2022-10-07 DIAGNOSIS — Z3A.41 POST TERM PREGNANCY AT 41 WEEKS GESTATION: ICD-10-CM

## 2022-10-07 DIAGNOSIS — O48.0 POST TERM PREGNANCY AT 41 WEEKS GESTATION: ICD-10-CM

## 2022-10-07 LAB
ABO GROUP BLD: NORMAL
BLD GP AB SCN SERPL QL: NEGATIVE
DEPRECATED RDW RBC AUTO: 37.6 FL (ref 37–54)
ERYTHROCYTE [DISTWIDTH] IN BLOOD BY AUTOMATED COUNT: 12.5 % (ref 12.3–15.4)
HCT VFR BLD AUTO: 34.5 % (ref 34–46.6)
HGB BLD-MCNC: 12.5 G/DL (ref 12–15.9)
MCH RBC QN AUTO: 30.6 PG (ref 26.6–33)
MCHC RBC AUTO-ENTMCNC: 36.2 G/DL (ref 31.5–35.7)
MCV RBC AUTO: 84.6 FL (ref 79–97)
PLATELET # BLD AUTO: 254 10*3/MM3 (ref 140–450)
PMV BLD AUTO: 10.3 FL (ref 6–12)
RBC # BLD AUTO: 4.08 10*6/MM3 (ref 3.77–5.28)
RH BLD: NEGATIVE
T&S EXPIRATION DATE: NORMAL
WBC NRBC COR # BLD: 9.16 10*3/MM3 (ref 3.4–10.8)

## 2022-10-07 PROCEDURE — 85027 COMPLETE CBC AUTOMATED: CPT | Performed by: OBSTETRICS & GYNECOLOGY

## 2022-10-07 PROCEDURE — 86900 BLOOD TYPING SEROLOGIC ABO: CPT | Performed by: OBSTETRICS & GYNECOLOGY

## 2022-10-07 PROCEDURE — 86901 BLOOD TYPING SEROLOGIC RH(D): CPT | Performed by: OBSTETRICS & GYNECOLOGY

## 2022-10-07 PROCEDURE — 3E033VJ INTRODUCTION OF OTHER HORMONE INTO PERIPHERAL VEIN, PERCUTANEOUS APPROACH: ICD-10-PCS | Performed by: OBSTETRICS & GYNECOLOGY

## 2022-10-07 PROCEDURE — 86850 RBC ANTIBODY SCREEN: CPT | Performed by: OBSTETRICS & GYNECOLOGY

## 2022-10-07 RX ORDER — ONDANSETRON 2 MG/ML
4 INJECTION INTRAMUSCULAR; INTRAVENOUS EVERY 6 HOURS PRN
Status: DISCONTINUED | OUTPATIENT
Start: 2022-10-07 | End: 2022-10-08 | Stop reason: SDUPTHER

## 2022-10-07 RX ORDER — LIDOCAINE HYDROCHLORIDE 10 MG/ML
5 INJECTION, SOLUTION EPIDURAL; INFILTRATION; INTRACAUDAL; PERINEURAL AS NEEDED
Status: DISCONTINUED | OUTPATIENT
Start: 2022-10-07 | End: 2022-10-08 | Stop reason: HOSPADM

## 2022-10-07 RX ORDER — TERBUTALINE SULFATE 1 MG/ML
0.25 INJECTION, SOLUTION SUBCUTANEOUS AS NEEDED
Status: DISCONTINUED | OUTPATIENT
Start: 2022-10-07 | End: 2022-10-08 | Stop reason: HOSPADM

## 2022-10-07 RX ORDER — SODIUM CHLORIDE, SODIUM LACTATE, POTASSIUM CHLORIDE, CALCIUM CHLORIDE 600; 310; 30; 20 MG/100ML; MG/100ML; MG/100ML; MG/100ML
125 INJECTION, SOLUTION INTRAVENOUS CONTINUOUS
Status: DISCONTINUED | OUTPATIENT
Start: 2022-10-07 | End: 2022-10-10 | Stop reason: HOSPADM

## 2022-10-07 RX ORDER — FAMOTIDINE 20 MG/1
20 TABLET, FILM COATED ORAL AS NEEDED
Status: DISCONTINUED | OUTPATIENT
Start: 2022-10-07 | End: 2022-10-08 | Stop reason: HOSPADM

## 2022-10-07 RX ORDER — TRISODIUM CITRATE DIHYDRATE AND CITRIC ACID MONOHYDRATE 500; 334 MG/5ML; MG/5ML
30 SOLUTION ORAL ONCE AS NEEDED
Status: DISCONTINUED | OUTPATIENT
Start: 2022-10-07 | End: 2022-10-08 | Stop reason: HOSPADM

## 2022-10-07 RX ORDER — ACETAMINOPHEN 325 MG/1
650 TABLET ORAL EVERY 6 HOURS PRN
Status: DISCONTINUED | OUTPATIENT
Start: 2022-10-07 | End: 2022-10-08 | Stop reason: SDUPTHER

## 2022-10-07 RX ORDER — MISOPROSTOL 100 MCG
50 TABLET ORAL ONCE
Status: COMPLETED | OUTPATIENT
Start: 2022-10-07 | End: 2022-10-07

## 2022-10-07 RX ORDER — SODIUM CHLORIDE 0.9 % (FLUSH) 0.9 %
10 SYRINGE (ML) INJECTION AS NEEDED
Status: DISCONTINUED | OUTPATIENT
Start: 2022-10-07 | End: 2022-10-08 | Stop reason: HOSPADM

## 2022-10-07 RX ORDER — MORPHINE SULFATE 5 MG/ML
4 INJECTION, SOLUTION INTRAMUSCULAR; INTRAVENOUS
Status: DISCONTINUED | OUTPATIENT
Start: 2022-10-07 | End: 2022-10-08 | Stop reason: HOSPADM

## 2022-10-07 RX ORDER — BUPRENORPHINE 2 MG/1
8 TABLET SUBLINGUAL 2 TIMES DAILY
COMMUNITY

## 2022-10-07 RX ORDER — FAMOTIDINE 10 MG/ML
20 INJECTION, SOLUTION INTRAVENOUS AS NEEDED
Status: DISCONTINUED | OUTPATIENT
Start: 2022-10-07 | End: 2022-10-08 | Stop reason: HOSPADM

## 2022-10-07 RX ORDER — SODIUM CHLORIDE 0.9 % (FLUSH) 0.9 %
3 SYRINGE (ML) INJECTION EVERY 12 HOURS SCHEDULED
Status: DISCONTINUED | OUTPATIENT
Start: 2022-10-07 | End: 2022-10-08 | Stop reason: HOSPADM

## 2022-10-07 RX ORDER — ONDANSETRON 4 MG/1
4 TABLET, FILM COATED ORAL EVERY 6 HOURS PRN
Status: DISCONTINUED | OUTPATIENT
Start: 2022-10-07 | End: 2022-10-08 | Stop reason: SDUPTHER

## 2022-10-07 RX ADMIN — MISOPROSTOL 50 MCG: 100 TABLET ORAL at 19:58

## 2022-10-07 RX ADMIN — SODIUM CHLORIDE, POTASSIUM CHLORIDE, SODIUM LACTATE AND CALCIUM CHLORIDE 125 ML/HR: 600; 310; 30; 20 INJECTION, SOLUTION INTRAVENOUS at 19:48

## 2022-10-07 NOTE — INTERVAL H&P NOTE
H&P reviewed. The patient was examined and there are changes to the H&P.  Patient's Suboxone has been changed to Subutex 8 mg twice daily.  Discussed postdates induction.  Plan p.o. Cytotec, epidural at patient request.  Anticipate vaginal delivery.  Risks have been discussed.

## 2022-10-08 ENCOUNTER — ANESTHESIA EVENT (OUTPATIENT)
Dept: LABOR AND DELIVERY | Facility: HOSPITAL | Age: 32
End: 2022-10-08

## 2022-10-08 ENCOUNTER — ANESTHESIA (OUTPATIENT)
Dept: LABOR AND DELIVERY | Facility: HOSPITAL | Age: 32
End: 2022-10-08

## 2022-10-08 PROCEDURE — 25010000002 OXYTOCIN PER 10 UNITS: Performed by: OBSTETRICS & GYNECOLOGY

## 2022-10-08 PROCEDURE — 25010000002 FENTANYL CITRATE (PF) 1000 MCG/20ML SOLUTION

## 2022-10-08 PROCEDURE — 25010000002 ROPIVACAINE PER 1 MG

## 2022-10-08 PROCEDURE — 59200 INSERT CERVICAL DILATOR: CPT | Performed by: OBSTETRICS & GYNECOLOGY

## 2022-10-08 PROCEDURE — 25010000002 MORPHINE SULFATE (PF) 5 MG/ML SOLUTION: Performed by: OBSTETRICS & GYNECOLOGY

## 2022-10-08 PROCEDURE — 0UQG7ZZ REPAIR VAGINA, VIA NATURAL OR ARTIFICIAL OPENING: ICD-10-PCS | Performed by: OBSTETRICS & GYNECOLOGY

## 2022-10-08 PROCEDURE — 25010000002 ONDANSETRON PER 1 MG: Performed by: OBSTETRICS & GYNECOLOGY

## 2022-10-08 PROCEDURE — 25010000002 FENTANYL CITRATE (PF) 50 MCG/ML SOLUTION: Performed by: ANESTHESIOLOGY

## 2022-10-08 PROCEDURE — C1755 CATHETER, INTRASPINAL: HCPCS | Performed by: ANESTHESIOLOGY

## 2022-10-08 RX ORDER — ACETAMINOPHEN 325 MG/1
650 TABLET ORAL EVERY 6 HOURS PRN
Status: DISCONTINUED | OUTPATIENT
Start: 2022-10-08 | End: 2022-10-08 | Stop reason: HOSPADM

## 2022-10-08 RX ORDER — OXYTOCIN 10 [USP'U]/ML
10 INJECTION, SOLUTION INTRAMUSCULAR; INTRAVENOUS ONCE
Status: DISCONTINUED | OUTPATIENT
Start: 2022-10-08 | End: 2022-10-08 | Stop reason: HOSPADM

## 2022-10-08 RX ORDER — SODIUM PHOSPHATE,MONO-DIBASIC 19G-7G/118
1 ENEMA (ML) RECTAL AS NEEDED
Status: DISCONTINUED | OUTPATIENT
Start: 2022-10-08 | End: 2022-10-08

## 2022-10-08 RX ORDER — LIDOCAINE HYDROCHLORIDE AND EPINEPHRINE 15; 5 MG/ML; UG/ML
INJECTION, SOLUTION EPIDURAL
Status: COMPLETED | OUTPATIENT
Start: 2022-10-08 | End: 2022-10-08

## 2022-10-08 RX ORDER — DIPHENHYDRAMINE HCL 25 MG
25 CAPSULE ORAL NIGHTLY PRN
Status: DISCONTINUED | OUTPATIENT
Start: 2022-10-08 | End: 2022-10-10 | Stop reason: HOSPADM

## 2022-10-08 RX ORDER — MISOPROSTOL 100 UG/1
200 TABLET ORAL
Status: DISCONTINUED | OUTPATIENT
Start: 2022-10-08 | End: 2022-10-08 | Stop reason: HOSPADM

## 2022-10-08 RX ORDER — BUPIVACAINE HYDROCHLORIDE 2.5 MG/ML
INJECTION, SOLUTION EPIDURAL; INFILTRATION; INTRACAUDAL
Status: COMPLETED
Start: 2022-10-08 | End: 2022-10-08

## 2022-10-08 RX ORDER — MISOPROSTOL 100 UG/1
200 TABLET ORAL ONCE
Status: DISCONTINUED | OUTPATIENT
Start: 2022-10-08 | End: 2022-10-08 | Stop reason: HOSPADM

## 2022-10-08 RX ORDER — IBUPROFEN 600 MG/1
600 TABLET ORAL EVERY 6 HOURS SCHEDULED
Status: DISCONTINUED | OUTPATIENT
Start: 2022-10-08 | End: 2022-10-08 | Stop reason: HOSPADM

## 2022-10-08 RX ORDER — ONDANSETRON 2 MG/ML
4 INJECTION INTRAMUSCULAR; INTRAVENOUS EVERY 6 HOURS PRN
Status: DISCONTINUED | OUTPATIENT
Start: 2022-10-08 | End: 2022-10-08 | Stop reason: HOSPADM

## 2022-10-08 RX ORDER — LIDOCAINE HYDROCHLORIDE 10 MG/ML
30 INJECTION, SOLUTION INFILTRATION; PERINEURAL AS NEEDED
Status: DISCONTINUED | OUTPATIENT
Start: 2022-10-08 | End: 2022-10-08 | Stop reason: HOSPADM

## 2022-10-08 RX ORDER — FENTANYL CITRATE 50 UG/ML
INJECTION, SOLUTION INTRAMUSCULAR; INTRAVENOUS
Status: COMPLETED
Start: 2022-10-08 | End: 2022-10-08

## 2022-10-08 RX ORDER — BISACODYL 10 MG
10 SUPPOSITORY, RECTAL RECTAL DAILY PRN
Status: DISCONTINUED | OUTPATIENT
Start: 2022-10-09 | End: 2022-10-10 | Stop reason: HOSPADM

## 2022-10-08 RX ORDER — BUPIVACAINE HYDROCHLORIDE 2.5 MG/ML
INJECTION, SOLUTION EPIDURAL; INFILTRATION; INTRACAUDAL AS NEEDED
Status: DISCONTINUED | OUTPATIENT
Start: 2022-10-08 | End: 2022-10-08 | Stop reason: SURG

## 2022-10-08 RX ORDER — EPHEDRINE SULFATE 50 MG/ML
5 INJECTION, SOLUTION INTRAVENOUS
Status: DISCONTINUED | OUTPATIENT
Start: 2022-10-08 | End: 2022-10-08 | Stop reason: HOSPADM

## 2022-10-08 RX ORDER — DOCUSATE SODIUM 100 MG/1
100 CAPSULE, LIQUID FILLED ORAL 2 TIMES DAILY
Status: DISCONTINUED | OUTPATIENT
Start: 2022-10-08 | End: 2022-10-10 | Stop reason: HOSPADM

## 2022-10-08 RX ORDER — OXYTOCIN/0.9 % SODIUM CHLORIDE 30/500 ML
2 PLASTIC BAG, INJECTION (ML) INTRAVENOUS
Status: DISCONTINUED | OUTPATIENT
Start: 2022-10-08 | End: 2022-10-10 | Stop reason: HOSPADM

## 2022-10-08 RX ORDER — METHYLERGONOVINE MALEATE 0.2 MG/ML
200 INJECTION INTRAVENOUS ONCE AS NEEDED
Status: DISCONTINUED | OUTPATIENT
Start: 2022-10-08 | End: 2022-10-08 | Stop reason: HOSPADM

## 2022-10-08 RX ORDER — IBUPROFEN 600 MG/1
600 TABLET ORAL EVERY 6 HOURS PRN
Status: DISCONTINUED | OUTPATIENT
Start: 2022-10-08 | End: 2022-10-10 | Stop reason: HOSPADM

## 2022-10-08 RX ORDER — CARBOPROST TROMETHAMINE 250 UG/ML
250 INJECTION, SOLUTION INTRAMUSCULAR AS NEEDED
Status: DISCONTINUED | OUTPATIENT
Start: 2022-10-08 | End: 2022-10-08 | Stop reason: HOSPADM

## 2022-10-08 RX ORDER — SODIUM CHLORIDE 0.9 % (FLUSH) 0.9 %
1-10 SYRINGE (ML) INJECTION AS NEEDED
Status: DISCONTINUED | OUTPATIENT
Start: 2022-10-08 | End: 2022-10-10 | Stop reason: HOSPADM

## 2022-10-08 RX ORDER — ONDANSETRON 4 MG/1
4 TABLET, FILM COATED ORAL EVERY 6 HOURS PRN
Status: DISCONTINUED | OUTPATIENT
Start: 2022-10-08 | End: 2022-10-08 | Stop reason: HOSPADM

## 2022-10-08 RX ORDER — FENTANYL CITRATE 50 UG/ML
INJECTION, SOLUTION INTRAMUSCULAR; INTRAVENOUS
Status: COMPLETED | OUTPATIENT
Start: 2022-10-08 | End: 2022-10-08

## 2022-10-08 RX ADMIN — ONDANSETRON 4 MG: 2 INJECTION INTRAMUSCULAR; INTRAVENOUS at 02:01

## 2022-10-08 RX ADMIN — ACETAMINOPHEN 650 MG: 325 TABLET ORAL at 15:50

## 2022-10-08 RX ADMIN — SODIUM PHOSPHATE 1 ENEMA: 7; 19 ENEMA RECTAL at 01:17

## 2022-10-08 RX ADMIN — IBUPROFEN 600 MG: 600 TABLET ORAL at 22:27

## 2022-10-08 RX ADMIN — BUPIVACAINE HYDROCHLORIDE 6 ML: 2.5 INJECTION, SOLUTION EPIDURAL; INFILTRATION; INTRACAUDAL; PERINEURAL at 03:09

## 2022-10-08 RX ADMIN — IBUPROFEN 600 MG: 600 TABLET ORAL at 17:09

## 2022-10-08 RX ADMIN — DOCUSATE SODIUM 100 MG: 100 CAPSULE, LIQUID FILLED ORAL at 21:57

## 2022-10-08 RX ADMIN — SODIUM CHLORIDE, POTASSIUM CHLORIDE, SODIUM LACTATE AND CALCIUM CHLORIDE 125 ML/HR: 600; 310; 30; 20 INJECTION, SOLUTION INTRAVENOUS at 01:17

## 2022-10-08 RX ADMIN — ROPIVACAINE HYDROCHLORIDE 10 ML/HR: 5 INJECTION EPIDURAL; INFILTRATION; PERINEURAL at 11:52

## 2022-10-08 RX ADMIN — LIDOCAINE HYDROCHLORIDE AND EPINEPHRINE 3 ML: 15; 5 INJECTION, SOLUTION EPIDURAL at 02:56

## 2022-10-08 RX ADMIN — OXYTOCIN 2 MILLI-UNITS/MIN: 10 INJECTION, SOLUTION INTRAMUSCULAR; INTRAVENOUS at 01:16

## 2022-10-08 RX ADMIN — MORPHINE SULFATE 4 MG: 5 INJECTION, SOLUTION INTRAMUSCULAR; INTRAVENOUS at 02:01

## 2022-10-08 RX ADMIN — FENTANYL CITRATE 100 MCG: 50 INJECTION, SOLUTION INTRAMUSCULAR; INTRAVENOUS at 02:56

## 2022-10-08 NOTE — ANESTHESIA PREPROCEDURE EVALUATION
Anesthesia Evaluation     Patient summary reviewed and Nursing notes reviewed   no history of anesthetic complications:  NPO Solid Status: > 8 hours  NPO Liquid Status: > 2 hours           Airway   Mallampati: I  TM distance: >3 FB  Neck ROM: full  No difficulty expected  Dental      Pulmonary - negative pulmonary ROS and normal exam    breath sounds clear to auscultation  Cardiovascular - negative cardio ROS and normal exam  Exercise tolerance: good (4-7 METS)    Rhythm: regular  Rate: normal        Neuro/Psych- negative ROS  GI/Hepatic/Renal/Endo    (+)  GERD well controlled,      Musculoskeletal         ROS comment: Ankylosing spondylitis  Abdominal    Substance History   (+) drug use (on suboxone rx)     OB/GYN    (+) Pregnant,         Other   arthritis,      ROS/Med Hx Other:                    Anesthesia Plan    ASA 2     epidural       Anesthetic plan, risks, benefits, and alternatives have been provided, discussed and informed consent has been obtained with: patient.  Pre-procedure education provided      CODE STATUS:    Code Status (Patient has no pulse and is not breathing): CPR (Attempt to Resuscitate)  Medical Interventions (Patient has pulse or is breathing): Full

## 2022-10-08 NOTE — PROGRESS NOTES
Progress Note    Lina Bardales  : 1990  MRN: [id number]  CSN: [id number]      Post-dates pregnancy  Late entry, epic would not load at 5 AM    Antepartum Progress Note    Subjective   Patient comfortable with epidural  Cook catheter came out at 445.     Objective   Cervix 4 cm.  Amniotomy at approximately 455.  Small amount of clear fluid noted.  Fetal heart rate 130, reactive       Assessment   1. IUP at 41w4d  2. Ongoing induction postdates     Plan   1. Continue Pitocin      Electronically signed by Sen Downey Sr., MD, 10/08/22, 8:01 AM EDT.

## 2022-10-08 NOTE — PROGRESS NOTES
Progress Note    Lina Bardales  : 1990  MRN: [id number]  CSN: [id number]      Post-dates pregnancy      Antepartum Progress Note    Subjective   Minimal complaints of pain       Objective   2 cm, 40% effaced.  -4 station.  Cervix displaced posteriorly secondary to full rectum.  Cook catheter placed.       Assessment   1. IUP at 41w4d  2. Adequate progress.     Plan   1. Start Pitocin.  2. Fleets enema necessary please      Electronically signed by Sen Downey Sr., MD, 10/08/22, 1:16 AM EDT.

## 2022-10-08 NOTE — PLAN OF CARE
Problem: Adult Inpatient Plan of Care  Goal: Plan of Care Review  Outcome: Ongoing, Progressing  Goal: Patient-Specific Goal (Individualized)  Outcome: Ongoing, Progressing  Goal: Absence of Hospital-Acquired Illness or Injury  Outcome: Ongoing, Progressing  Goal: Optimal Comfort and Wellbeing  Outcome: Ongoing, Progressing  Goal: Readiness for Transition of Care  Outcome: Ongoing, Progressing     Problem: Bleeding (Labor)  Goal: Hemostasis  Outcome: Ongoing, Progressing     Problem: Change in Fetal Wellbeing (Labor)  Goal: Stable Fetal Wellbeing  Outcome: Ongoing, Progressing     Problem: Delayed Labor Progression (Labor)  Goal: Effective Progression to Delivery  Outcome: Ongoing, Progressing     Problem: Infection (Labor)  Goal: Absence of Infection Signs and Symptoms  Outcome: Ongoing, Progressing     Problem: Labor Pain (Labor)  Goal: Acceptable Pain Control  Outcome: Ongoing, Progressing     Problem: Uterine Tachysystole (Labor)  Goal: Normal Uterine Contraction Pattern  Outcome: Ongoing, Progressing   Goal Outcome Evaluation:    progressing

## 2022-10-08 NOTE — PLAN OF CARE
Goal Outcome Evaluation:   Patient admitted for IOL secondary to post dates.  Labor progressed and  of viable male infant.  FF -1 U, light lochia, perineum with small laceration repaired.  Up to BR with minimal assist, voided, frederick care performed.  Scheduled Tylenol and Motrin for abdominal cramping.  [place] [facility] [place]

## 2022-10-08 NOTE — L&D DELIVERY NOTE
Atnunez  Vaginal Delivery Note    Delivery     Delivery:      YOB: 2024    Time of Birth:  Gestational Age 2:45 PM   41w4d     Anesthesia: Epidural     Delivering clinician:     Forceps?   No   Vacuum? No    Shoulder dystocia present: No        Delivery narrative:  I was called to the room as the patient was complete complete +2 station.  The patient underwent a spontaneous vaginal delivery of a viable male  at 1500 hrs.  The infant was bulb suctioned.  The cord was clamped x2 and cut after at least 60 seconds.  The placenta was delivered spontaneously and intact.  The weight was 7  pounds and 13 ounces and the Apgars were 8 and 9.   The vaginal and cervical exams were within normal limits.  The infant was in the warmer and mom was in recovery in stable and satisfactory condition.  The sponge counts and instrument counts were verified as correct.    Infant    Findings: male  infant     Infant observations: Weight: 3550 g (7 lb 13.2 oz)   Length: 19.5  in  Observations/Comments:        Apgars: 8  @ 1 minute /    9  @ 5 minutes         Placenta, Cord, and Fluid    Placenta delivered    at        Cord:   present.   Nuchal Cord?  no   Cord blood obtained:     Cord gases obtained:      Cord gas results: Venous:  No results found for: PHCVEN    Arterial:  No results found for: PHCART     Repair    Episiotomy: Not recorded     No    Lacerations: Yes  Laceration Information  Laceration Repaired?   Perineal:       Periurethral:       Labial:       Sulcus:       Vaginal:    1 figure of 8 suture placed   Cervical:         Suture used for repair: 3-0 Vicryl   Estimated Blood Loss:           Complications  none    Disposition  Mother to Mother Baby/Postpartum  in stable condition currently.  Baby to remains with mom in stable condition currently.    Electronically signed by Sheba Souza MD, 10/08/22, 3:06 PM EDT.

## 2022-10-08 NOTE — ANESTHESIA PROCEDURE NOTES
Labor Epidural    Pre-sedation assessment completed: 10/8/2022 2:56 AM    Patient reassessed immediately prior to procedure    Patient location during procedure: OB  Start Time: 10/8/2022 2:56 AM  Performed By  Anesthesiologist: Reyes, Mirabelle, DO  Preanesthetic Checklist  Completed: patient identified, IV checked, site marked, risks and benefits discussed, surgical consent, monitors and equipment checked, pre-op evaluation and timeout performed  Prep:  Pt Position:sitting  Sterile Tech:cap, gloves, sterile barrier, mask and gown  Prep:chlorhexidine gluconate and isopropyl alcohol  Monitoring:blood pressure monitoring, continuous pulse oximetry and EKG  Epidural Block Procedure:  Approach:midline  Guidance:landmark technique and palpation technique  Location:L2-L3  Needle Type:Tuohy  Needle Gauge:17 G  Loss of Resistance Medium: saline  Loss of Resistance: 5cm  Cath Depth at skin:9 cm  Paresthesia: none  Aspiration:negative  Test Dose:negative  Medication: lidocaine 1.5%-EPINEPHrine 1:200,000 (XYLOCAINE W/EPI) injection - Epidural   3 mL - 10/8/2022 2:56:00 AM  fentaNYL citrate (PF) (SUBLIMAZE) injection - Epidural   100 mcg - 10/8/2022 2:56:00 AM  Number of Attempts: 1  Post Assessment:  Dressing:occlusive dressing applied and secured with tape  Pt Tolerance:patient tolerated the procedure well with no apparent complications  Complications:no

## 2022-10-09 LAB
HCT VFR BLD AUTO: 31 % (ref 34–46.6)
HGB BLD-MCNC: 11 G/DL (ref 12–15.9)

## 2022-10-09 PROCEDURE — 85018 HEMOGLOBIN: CPT | Performed by: OBSTETRICS & GYNECOLOGY

## 2022-10-09 PROCEDURE — 0503F POSTPARTUM CARE VISIT: CPT | Performed by: STUDENT IN AN ORGANIZED HEALTH CARE EDUCATION/TRAINING PROGRAM

## 2022-10-09 PROCEDURE — 85014 HEMATOCRIT: CPT | Performed by: OBSTETRICS & GYNECOLOGY

## 2022-10-09 RX ADMIN — IBUPROFEN 600 MG: 600 TABLET ORAL at 05:42

## 2022-10-09 RX ADMIN — IBUPROFEN 600 MG: 600 TABLET ORAL at 11:08

## 2022-10-09 RX ADMIN — IBUPROFEN 600 MG: 600 TABLET ORAL at 16:25

## 2022-10-09 RX ADMIN — IBUPROFEN 600 MG: 600 TABLET ORAL at 23:01

## 2022-10-09 RX ADMIN — DOCUSATE SODIUM 100 MG: 100 CAPSULE, LIQUID FILLED ORAL at 11:08

## 2022-10-09 RX ADMIN — DOCUSATE SODIUM 100 MG: 100 CAPSULE, LIQUID FILLED ORAL at 20:17

## 2022-10-09 NOTE — PROGRESS NOTES
"PostPartum/PostOp PROGRESS NOTE        Subjective:  Patient has no complaints  Pain controlled  Tolerating a regular diet  Passing flatus  Ambulating  Urinating spontaneously  Lochia decreasing, no bleeding concerns  Denies HA, vision change, or RUQ/epigastric pain  No lightheadedness or dizziness    Objective:  Vitals: Blood pressure 120/75, pulse 87, temperature 98.7 °F (37.1 °C), temperature source Oral, resp. rate 18, height 167.6 cm (66\"), weight 68 kg (150 lb), last menstrual period 2021, SpO2 97 %, not currently breastfeeding.          General: NAD, alert and oriented, appropriate  Psych: Normal mood, appropriate  Abdomen: Fundus firm, non tender  Extremeties: Trace edema    Labs:  Lab Results (last 24 hours)     ** No results found for the last 24 hours. **            Assessment:    Post-partum/postop Day:  1  Status post   Hx of Substance abuse, stable on Subutex    Plan:   Routine postpartum/postop care  Contraception:unsure, [email or web address] discussed  Breast-feeding:: bottle  Advance diet, Ambulate, Remove IV, Shower, PO pain meds, PP/PO precautions given  Doing well day #1,  being observed for RAGHAVENDRA 2/2 subutex. Continue with routine care. Anticipate d/c tomorrow.     Electronically signed by Aj Wing MD, 10/09/22, 11:19 AM EDT.    "

## 2022-10-09 NOTE — PLAN OF CARE
Goal Outcome Evaluation:      Patient is up ad tunde.  VSS. Patient is regularly asking for Motrin for complaints of cramping. Patient understands RAGHAVENDRA withdrawal symptoms in infant and reports them to nurse for appropriate scoring.

## 2022-10-09 NOTE — PLAN OF CARE
Problem: Adult Inpatient Plan of Care  Goal: Plan of Care Review  Outcome: Ongoing, Progressing  Goal: Patient-Specific Goal (Individualized)  Outcome: Ongoing, Progressing  Goal: Absence of Hospital-Acquired Illness or Injury  Outcome: Ongoing, Progressing  Intervention: Identify and Manage Fall Risk  Recent Flowsheet Documentation  Taken 10/9/2022 0540 by Jaye Saba RN  Safety Promotion/Fall Prevention: safety round/check completed  Taken 10/8/2022 2355 by Jaye Saba RN  Safety Promotion/Fall Prevention: safety round/check completed  Taken 10/8/2022 2245 by Jaye Saba RN  Safety Promotion/Fall Prevention: safety round/check completed  Intervention: Prevent and Manage VTE (Venous Thromboembolism) Risk  Recent Flowsheet Documentation  Taken 10/8/2022 2245 by Jaye Saba RN  Activity Management:   up ad tunde   activity adjusted per tolerance  Goal: Optimal Comfort and Wellbeing  Outcome: Ongoing, Progressing  Intervention: Monitor Pain and Promote Comfort  Recent Flowsheet Documentation  Taken 10/9/2022 0540 by Jaye Saba RN  Pain Management Interventions:   heat applied   see MAR  Taken 10/8/2022 2245 by Jaye Saba RN  Pain Management Interventions: heat applied  Intervention: Provide Person-Centered Care  Recent Flowsheet Documentation  Taken 10/8/2022 2245 by Jaye Saba RN  Trust Relationship/Rapport:   care explained   choices provided   reassurance provided   questions answered  Goal: Readiness for Transition of Care  Outcome: Ongoing, Progressing     Problem: Adjustment to Role Transition (Postpartum Vaginal Delivery)  Goal: Successful Maternal Role Transition  Outcome: Ongoing, Progressing  Intervention: Support Maternal Role Transition  Recent Flowsheet Documentation  Taken 10/8/2022 2245 by Jaye Saba RN  Supportive Measures:   decision-making supported   positive reinforcement provided   relaxation techniques promoted  Parent/Child Attachment Promotion:   caring behavior  modeled   face-to-face positioning promoted     Problem: Bleeding (Postpartum Vaginal Delivery)  Goal: Hemostasis  Outcome: Ongoing, Progressing     Problem: Infection (Postpartum Vaginal Delivery)  Goal: Absence of Infection Signs/Symptoms  Outcome: Ongoing, Progressing     Problem: Pain (Postpartum Vaginal Delivery)  Goal: Acceptable Pain Control  Outcome: Ongoing, Progressing  Intervention: Prevent or Manage Pain  Recent Flowsheet Documentation  Taken 10/9/2022 0525 by Jaye Saba, RN  Pain Management Interventions:   heat applied   see MAR  Taken 10/8/2022 2245 by Jaye Saba, RN  Pain Management Interventions: heat applied     Problem: Urinary Retention (Postpartum Vaginal Delivery)  Goal: Effective Urinary Elimination  Outcome: Ongoing, Progressing   Goal Outcome Evaluation:      Vital signs stable. Independent ADLs, good infant maternal interaction. Obstetrically stable. Manageable pain control with Motrin and heat pad.

## 2022-10-09 NOTE — ANESTHESIA POSTPROCEDURE EVALUATION
Patient: Lina Bardales    Procedure Summary     Date: 10/08/22 Room / Location:     Anesthesia Start: 0256 Anesthesia Stop: 1445    Procedure: LABOR ANALGESIA Diagnosis:     Scheduled Providers:  Provider: Reyes, Mirabelle, DO    Anesthesia Type: epidural ASA Status: 2          Anesthesia Type: epidural    Vitals  Vitals Value Taken Time   /79 10/09/22 1315   Temp 37.1 °C (98.7 °F) 10/09/22 1315   Pulse 81 10/09/22 1315   Resp 18 10/09/22 1315   SpO2 97 % 10/08/22 1500           Post Anesthesia Care and Evaluation    Patient location during evaluation: bedside  Patient participation: complete - patient participated  Level of consciousness: awake  Pain management: adequate    Airway patency: patent  Anesthetic complications: No anesthetic complications  PONV Status: none  Cardiovascular status: acceptable and stable  Respiratory status: acceptable  Hydration status: acceptable    Comments: An Anesthesiologist personally participated in the most demanding procedures (including induction and emergence if applicable) in the anesthesia plan, monitored the course of anesthesia administration at frequent intervals and remained physically present and available for immediate diagnosis and treatment of emergencies.

## 2022-10-10 VITALS
BODY MASS INDEX: 24.11 KG/M2 | DIASTOLIC BLOOD PRESSURE: 57 MMHG | SYSTOLIC BLOOD PRESSURE: 96 MMHG | OXYGEN SATURATION: 97 % | RESPIRATION RATE: 16 BRPM | HEIGHT: 66 IN | TEMPERATURE: 98.4 F | HEART RATE: 91 BPM | WEIGHT: 150 LBS

## 2022-10-10 PROCEDURE — 0503F POSTPARTUM CARE VISIT: CPT | Performed by: STUDENT IN AN ORGANIZED HEALTH CARE EDUCATION/TRAINING PROGRAM

## 2022-10-10 RX ORDER — ACETAMINOPHEN 500 MG
1000 TABLET ORAL EVERY 6 HOURS
Qty: 40 TABLET | Refills: 0 | Status: SHIPPED | OUTPATIENT
Start: 2022-10-10 | End: 2022-10-15

## 2022-10-10 RX ORDER — IBUPROFEN 600 MG/1
600 TABLET ORAL EVERY 6 HOURS PRN
Qty: 20 TABLET | Refills: 0 | Status: SHIPPED | OUTPATIENT
Start: 2022-10-10 | End: 2022-10-15

## 2022-10-10 RX ADMIN — DOCUSATE SODIUM 100 MG: 100 CAPSULE, LIQUID FILLED ORAL at 08:09

## 2022-10-10 RX ADMIN — IBUPROFEN 600 MG: 600 TABLET ORAL at 12:04

## 2022-10-10 RX ADMIN — IBUPROFEN 600 MG: 600 TABLET ORAL at 05:03

## 2022-10-10 NOTE — PLAN OF CARE
Problem: Adult Inpatient Plan of Care  Goal: Plan of Care Review  Outcome: Ongoing, Progressing  Goal: Patient-Specific Goal (Individualized)  Outcome: Ongoing, Progressing  Goal: Absence of Hospital-Acquired Illness or Injury  Outcome: Ongoing, Progressing  Intervention: Identify and Manage Fall Risk  Recent Flowsheet Documentation  Taken 10/9/2022 2200 by Lina Torres RN  Safety Promotion/Fall Prevention: safety round/check completed  Taken 10/9/2022 2015 by Lina Torres RN  Safety Promotion/Fall Prevention: safety round/check completed  Intervention: Prevent Skin Injury  Recent Flowsheet Documentation  Taken 10/9/2022 2015 by Lina Torres RN  Body Position: position changed independently  Intervention: Prevent and Manage VTE (Venous Thromboembolism) Risk  Recent Flowsheet Documentation  Taken 10/9/2022 2015 by Lina Torres RN  Activity Management: up ad tunde  Goal: Optimal Comfort and Wellbeing  Outcome: Ongoing, Progressing  Intervention: Monitor Pain and Promote Comfort  Recent Flowsheet Documentation  Taken 10/9/2022 2015 by Lina Torres RN  Pain Management Interventions:   care clustered   heat applied   pain management plan reviewed with patient/caregiver   quiet environment facilitated  Intervention: Provide Person-Centered Care  Recent Flowsheet Documentation  Taken 10/9/2022 2015 by Lina Torres RN  Trust Relationship/Rapport:   care explained   choices provided   questions answered   questions encouraged   thoughts/feelings acknowledged  Goal: Readiness for Transition of Care  Outcome: Ongoing, Progressing     Problem: Adjustment to Role Transition (Postpartum Vaginal Delivery)  Goal: Successful Maternal Role Transition  Outcome: Ongoing, Progressing     Problem: Bleeding (Postpartum Vaginal Delivery)  Goal: Hemostasis  Outcome: Ongoing, Progressing     Problem: Infection (Postpartum Vaginal Delivery)  Goal: Absence of Infection Signs/Symptoms  Outcome: Ongoing, Progressing     Problem: Pain  (Postpartum Vaginal Delivery)  Goal: Acceptable Pain Control  Outcome: Ongoing, Progressing  Intervention: Prevent or Manage Pain  Recent Flowsheet Documentation  Taken 10/9/2022 2015 by Lina Torres RN  Pain Management Interventions:   care clustered   heat applied   pain management plan reviewed with patient/caregiver   quiet environment facilitated     Problem: Urinary Retention (Postpartum Vaginal Delivery)  Goal: Effective Urinary Elimination  Outcome: Ongoing, Progressing   Goal Outcome Evaluation:      Continuing to monitor pain level and lochia, fundus and VS WNL. Ambulating and urinating well. Progressing towards care plan goals. BECCA RN

## 2022-10-10 NOTE — PLAN OF CARE
Problem: Adult Inpatient Plan of Care  Goal: Plan of Care Review  Outcome: Met  Goal: Patient-Specific Goal (Individualized)  Outcome: Met  Goal: Absence of Hospital-Acquired Illness or Injury  Outcome: Met  Intervention: Identify and Manage Fall Risk  Recent Flowsheet Documentation  Taken 10/10/2022 0815 by Kimberly Canales, RN  Safety Promotion/Fall Prevention: safety round/check completed  Intervention: Prevent and Manage VTE (Venous Thromboembolism) Risk  Recent Flowsheet Documentation  Taken 10/10/2022 0815 by Kimebrly Canales RN  Activity Management: up ad tunde  Goal: Optimal Comfort and Wellbeing  Outcome: Met  Intervention: Provide Person-Centered Care  Recent Flowsheet Documentation  Taken 10/10/2022 0815 by Kimberly Canales RN  Trust Relationship/Rapport:   care explained   choices provided   emotional support provided   empathic listening provided   questions answered   questions encouraged   reassurance provided   thoughts/feelings acknowledged  Goal: Readiness for Transition of Care  Outcome: Met     Problem: Adjustment to Role Transition (Postpartum Vaginal Delivery)  Goal: Successful Maternal Role Transition  Outcome: Met     Problem: Bleeding (Postpartum Vaginal Delivery)  Goal: Hemostasis  Outcome: Met     Problem: Infection (Postpartum Vaginal Delivery)  Goal: Absence of Infection Signs/Symptoms  Outcome: Met     Problem: Pain (Postpartum Vaginal Delivery)  Goal: Acceptable Pain Control  Outcome: Met     Problem: Urinary Retention (Postpartum Vaginal Delivery)  Goal: Effective Urinary Elimination  Outcome: Met   Goal Outcome Evaluation:

## 2022-10-10 NOTE — DISCHARGE INSTRUCTIONS
DR. SMITH'S POSTPARTUM DISCHARGE PRECAUTIONS and Answers to FAQs    NO SEX for [SIX] weeks.    NO TUB BATH or POOL for [TWO] week(s), shower only.  Sitz baths are fine.    STITCHES (if present):  wash them daily in the shower with soap and water (any type of soap is fine, it does not need to be antibacterial soap).  It is ok to gently put your finger in and around the vaginal area.  Look at your stitches (the ones on the outside) when you get home.  You will then know what is normal and can have a point of reference to compare it to if you start to have concerns.  REDNESS, PUS, increase in PAIN, FEVER or CHILLS are all reasons to be seen our office immediately.  Go to the ER, if it is after hours or a weekend.      VAGINAL BLEEDING:  may continue on and off over the next several weeks after delivery and may increase slightly once you go home.  You should not be bleeding more than 1 large pad soaked every hour or two.  Clots (even the size of a lemon or larger) may be normal as long as the bleeding is not heavy and the clots do not continue.      FEVER or CHILLS or NOT FEELING WELL: call our office.  If the office is closed, you need to be seen in acute care or ER.      CHEST PAIN or SHORTNESS OF BREATH/AIR: you need to GO TO THE NEAREST ER or CALL 911.     SWELLING: can increase over the next 7-10 days and then should slowly improve.  Your legs/ankles should be fairly similar in size.  A red, painful, hot, swollen leg (usually just one side) can be a sign of a blood clot and should be evaluated immediately.  Call our office.  If it is after hours or a weekend, you must be seen IMMEDIATELY IN THE ER.     ELEVATED BLOOD PRESSURE:  you need to contact us if you are having  persistent elevated BP systolic (top number) more than [155] or diastolic (bottom number) more than [95], or a headache (not relieved with rest, hydration or over the counter pain reliever), an increase in your swelling (usually hands and face),  changes in your vision (typically flashing white or black spots) or severe persistent pain in the location of the upper right side of your belly (under your right breast).  Call our office or go to ER if after hours or a weekend.    LACTATION QUESTIONS or CONCERNS?  Call [facility] Lactation Support [phone].    WORK and SCHOOL TIME OFF: depends on your specific delivery type, surrounding circumstances, and your work insurance/school rules.  If you have questions, please call [employer] or [place] at [phone] (ext. [phone]).  Or email [employer] at [email or web address].  They will assist in required paperwork for you and/or family members.     Any further QUESTIONS or CONCERNS, please call [facility] at [phone].

## 2022-10-10 NOTE — DISCHARGE SUMMARY
[facility]         DISCHARGE SUMMARY    Patient Name: Lina Bardales  : 1990  MRN: [id number]    Date of Admission: 10/7/2022  Date of Discharge:  10/10/2022   Primary Care Physician: Provider, No Known    Consults     Date and Time Order Name Status Description    10/7/2022  7:02 PM Inpatient Anesthesiology Consult             Procedures:      Presenting Problem:   Post-dates pregnancy [O48.0]    Admitting Diagnosis:  Postdates pregnancy  History of substance use disorder    Discharge Diagnosis:  Vaginal delivery delivered    Delivery Summary     OB Surgeon: Sheba Souza MD  Anesthesia: Epidural  Delivery Type:   Perineum: OBPERINEUM: Vaginal  Feeding method: Bottle    Infant: male  infant;    Weight: 3550 g (7 lb 13.2 oz)     APGARS: 8  @ 1 minute / 9  @ 5 minutes   Venous Blood Gas: No results found for: PHCVEN   Arterial Blood Gas: No results found for: PHCART     Hospital Course     Hospital Course:  Lina Bardales is a 31 y.o.  41w4d who presented on 10/7/2022 for induction of labor with Dr. Downey.  Patient progressed through the evening of 10 7 with delivery on 10 8 spontaneously.  Postpartum course uncomplicated.  By day of discharge her vital signs were stable, she was voiding, ambulating, eating and pain was tolerable. She was deemed stable for discharge with follow up in the outpatient setting.      Day of Discharge     Vital Signs:  Temp:  [97.5 °F (36.4 °C)-98.7 °F (37.1 °C)] 98.4 °F (36.9 °C)  Heart Rate:  [79-91] 91  Resp:  [16-18] 16  BP: ()/(57-79) 96/57    Pertinent  and/or Most Recent Results     LAB RESULTS:       Lab 10/09/22  1226 10/07/22  1951   WBC  --  9.16   HEMOGLOBIN 11.0* 12.5   HEMATOCRIT 31.0* 34.5   PLATELETS  --  254   MCV  --  84.6                 Lab 10/07/22  1951   ABO TYPING AB   RH TYPING Negative   ANTIBODY SCREEN Negative     URINALYSIS@  Microbiology Results (last 10 days)     ** No results found for the last 240 hours.  **             Discharge Details        Discharge Medications      New Medications      Instructions Start Date   acetaminophen 500 MG tablet  Commonly known as: TYLENOL   1,000 mg, Oral, Every 6 Hours      ibuprofen 600 MG tablet  Commonly known as: ADVIL,MOTRIN   600 mg, Oral, Every 6 Hours PRN         Continue These Medications      Instructions Start Date   buprenorphine 2 MG sublingual tablet SL tablet  Commonly known as: SUBUTEX   8 mg, Sublingual, 2 Times Daily      Prenatal Vitamin 27-0.8 MG tablet   1 each, Oral, Daily         Stop These Medications    buprenorphine-naloxone 8-2 MG per SL tablet  Commonly known as: SUBOXONE     Lidocaine Viscous HCl 2 % solution  Commonly known as: XYLOCAINE     triamcinolone 0.1 % paste  Commonly known as: KENALOG            No Known Allergies    Discharge Disposition:   Home, self-care    Discharge Condition:  Good    Diet:   Regular    Discharge Activity:   See detailed discharge instructions    Follow Up:  5 weeks with Dr. Downey or provider of choice.    Electronically signed by Aj Wing MD, 10/10/22, 11:30 AM EDT.

## 2022-10-10 NOTE — PROGRESS NOTES
"PostPartum/PostOp PROGRESS NOTE        Subjective:  Patient has no complaints  Pain controlled  Tolerating a regular diet  Passing flatus  Ambulating  Urinating spontaneously  Lochia decreasing, no bleeding concerns  Denies HA, vision change, or RUQ/epigastric pain  No lightheadedness or dizziness  Desires DC home if baby Dc'd    Objective:  Vitals: Blood pressure 96/57, pulse 91, temperature 98.4 °F (36.9 °C), temperature source Oral, resp. rate 16, height 167.6 cm (66\"), weight 68 kg (150 lb), last menstrual period 2021, SpO2 97 %, not currently breastfeeding.          General: NAD, alert and oriented, appropriate  Psych: Normal mood, appropriate  Abdomen: Fundus firm, non tender  Extremeties: No edema    Labs:  Lab Results (last 24 hours)     Procedure Component Value Units Date/Time    Hemoglobin & Hematocrit, Blood [[id number]]  (Abnormal) Collected: 10/09/22 1226    Specimen: Blood Updated: 10/09/22 1233     Hemoglobin 11.0 g/dL      Hematocrit 31.0 %             Assessment:    Post-partum/postop Day:  2  Status post   Hx of substance abuse disorder, stable on Subutex     Plan:   Routine postpartum/postop care  Contraception:unsure, [email or web address] discussed  Breast-feeding:: bottle  Advance diet, Ambulate, Remove IV, Shower, PO pain meds, Importance of wound care/keep clean and dry, Discharge home, DC meds reviewed, Follow up scheduled, PP/PO precautions given  Doing well day #2, D/C @ 48 hrs.     Electronically signed by Aj Wing MD, 10/10/22, 11:26 AM EDT.    "

## 2022-11-10 ENCOUNTER — TELEPHONE (OUTPATIENT)
Dept: OBSTETRICS AND GYNECOLOGY | Facility: CLINIC | Age: 32
End: 2022-11-10

## 2022-11-10 NOTE — TELEPHONE ENCOUNTER
Caller: Lina Bardales    Relationship to patient: Self    Best call back number: [phone]    Patient is needing: PATIENT CANCELED POSTPARTUM APPT FOR TODAY AT 10 DUE TO BEING SICK. VERY CONGESTED. DR. GREER NEXT AVAILABLE APPT IS 12/15/22.   PLEASE CALL PATIENT TO R/S POSTPARTUM APPT. HUB UNABLE TO WARM TRANSFER

## 2022-11-15 NOTE — TELEPHONE ENCOUNTER
Spoke with pt and rescheduled post partum appt. Offered an appt tomorrow but pt was unable to do. Scheduled for next available on 12/07 and advised she can call to check for cancellations.

## 2024-02-26 ENCOUNTER — TELEPHONE (OUTPATIENT)
Dept: OBSTETRICS AND GYNECOLOGY | Facility: CLINIC | Age: 34
End: 2024-02-26
Payer: COMMERCIAL

## 2024-02-26 DIAGNOSIS — N91.2 AMENORRHEA: Primary | ICD-10-CM

## 2024-02-28 ENCOUNTER — TELEPHONE (OUTPATIENT)
Dept: OBSTETRICS AND GYNECOLOGY | Facility: CLINIC | Age: 34
End: 2024-02-28
Payer: COMMERCIAL

## 2024-03-01 ENCOUNTER — TELEPHONE (OUTPATIENT)
Dept: OBSTETRICS AND GYNECOLOGY | Facility: CLINIC | Age: 34
End: 2024-03-01
Payer: COMMERCIAL

## 2024-03-01 NOTE — TELEPHONE ENCOUNTER
RhoGAM is administered at approximately 28 weeks routinely for patients who are Rh negative.  She would only need an additional dose if she has any significant bleeding during her pregnancy.

## 2024-03-01 NOTE — TELEPHONE ENCOUNTER
This pt called to make a new ob apt but stated with every pregnancy she has to have a rhogam shot. She was asking about that.    Please Advise.

## 2024-04-01 ENCOUNTER — TELEPHONE (OUTPATIENT)
Dept: OBSTETRICS AND GYNECOLOGY | Facility: CLINIC | Age: 34
End: 2024-04-01
Payer: COMMERCIAL

## 2024-04-10 ENCOUNTER — TELEPHONE (OUTPATIENT)
Dept: OBSTETRICS AND GYNECOLOGY | Facility: CLINIC | Age: 34
End: 2024-04-10

## 2024-04-26 ENCOUNTER — INITIAL PRENATAL (OUTPATIENT)
Dept: OBSTETRICS AND GYNECOLOGY | Facility: CLINIC | Age: 34
End: 2024-04-26
Payer: COMMERCIAL

## 2024-04-26 ENCOUNTER — TELEPHONE (OUTPATIENT)
Dept: OBSTETRICS AND GYNECOLOGY | Facility: CLINIC | Age: 34
End: 2024-04-26
Payer: COMMERCIAL

## 2024-04-26 VITALS — DIASTOLIC BLOOD PRESSURE: 96 MMHG | BODY MASS INDEX: 24.21 KG/M2 | WEIGHT: 150 LBS | SYSTOLIC BLOOD PRESSURE: 130 MMHG

## 2024-04-26 DIAGNOSIS — O09.30 LATE PRENATAL CARE: ICD-10-CM

## 2024-04-26 DIAGNOSIS — F11.20 SUBOXONE MAINTENANCE TREATMENT COMPLICATING PREGNANCY, ANTEPARTUM: ICD-10-CM

## 2024-04-26 DIAGNOSIS — O09.529 ANTEPARTUM MULTIGRAVIDA OF ADVANCED MATERNAL AGE: ICD-10-CM

## 2024-04-26 DIAGNOSIS — O99.320 SUBOXONE MAINTENANCE TREATMENT COMPLICATING PREGNANCY, ANTEPARTUM: ICD-10-CM

## 2024-04-26 DIAGNOSIS — O26.899 RH NEGATIVE, ANTEPARTUM: ICD-10-CM

## 2024-04-26 DIAGNOSIS — Z30.09 UNWANTED FERTILITY: ICD-10-CM

## 2024-04-26 DIAGNOSIS — Z67.91 RH NEGATIVE, ANTEPARTUM: ICD-10-CM

## 2024-04-26 DIAGNOSIS — Z34.80 SUPERVISION OF OTHER NORMAL PREGNANCY, ANTEPARTUM: Primary | ICD-10-CM

## 2024-04-26 PROBLEM — O09.899 RUBELLA NON-IMMUNE STATUS, ANTEPARTUM: Status: RESOLVED | Noted: 2022-05-16 | Resolved: 2024-04-26

## 2024-04-26 PROBLEM — Z34.82 PRENATAL CARE, SUBSEQUENT PREGNANCY IN SECOND TRIMESTER: Status: RESOLVED | Noted: 2022-05-12 | Resolved: 2024-04-26

## 2024-04-26 PROBLEM — Z3A.41 POST TERM PREGNANCY AT 41 WEEKS GESTATION: Status: RESOLVED | Noted: 2022-09-28 | Resolved: 2024-04-26

## 2024-04-26 PROBLEM — O48.0 POST TERM PREGNANCY AT 41 WEEKS GESTATION: Status: RESOLVED | Noted: 2022-09-28 | Resolved: 2024-04-26

## 2024-04-26 PROBLEM — O48.0 POST-DATES PREGNANCY: Status: RESOLVED | Noted: 2022-10-07 | Resolved: 2024-04-26

## 2024-04-26 PROBLEM — Z28.39 RUBELLA NON-IMMUNE STATUS, ANTEPARTUM: Status: RESOLVED | Noted: 2022-05-16 | Resolved: 2024-04-26

## 2024-04-26 LAB
ABO GROUP BLD: NORMAL
AMPHET+METHAMPHET UR QL: NEGATIVE
B-HCG UR QL: POSITIVE
BARBITURATES UR QL SCN: NEGATIVE
BASOPHILS # BLD AUTO: 0.04 10*3/MM3 (ref 0–0.2)
BASOPHILS NFR BLD AUTO: 0.4 % (ref 0–1.5)
BENZODIAZ UR QL SCN: NEGATIVE
BLD GP AB SCN SERPL QL: NEGATIVE
CANNABINOIDS SERPL QL: POSITIVE
COCAINE UR QL: NEGATIVE
DEPRECATED RDW RBC AUTO: 41 FL (ref 37–54)
EOSINOPHIL # BLD AUTO: 0.21 10*3/MM3 (ref 0–0.4)
EOSINOPHIL NFR BLD AUTO: 2.2 % (ref 0.3–6.2)
ERYTHROCYTE [DISTWIDTH] IN BLOOD BY AUTOMATED COUNT: 13.1 % (ref 12.3–15.4)
EXPIRATION DATE: ABNORMAL
FENTANYL UR-MCNC: NEGATIVE NG/ML
GLUCOSE UR STRIP-MCNC: NEGATIVE MG/DL
HBV SURFACE AG SERPL QL IA: NORMAL
HCT VFR BLD AUTO: 35.5 % (ref 34–46.6)
HCV AB SER QL: NORMAL
HGB BLD-MCNC: 11.5 G/DL (ref 12–15.9)
HIV 1+2 AB+HIV1 P24 AG SERPL QL IA: NORMAL
IMM GRANULOCYTES # BLD AUTO: 0.05 10*3/MM3 (ref 0–0.05)
IMM GRANULOCYTES NFR BLD AUTO: 0.5 % (ref 0–0.5)
INTERNAL NEGATIVE CONTROL: NEGATIVE
INTERNAL POSITIVE CONTROL: ABNORMAL
LYMPHOCYTES # BLD AUTO: 2.22 10*3/MM3 (ref 0.7–3.1)
LYMPHOCYTES NFR BLD AUTO: 23.2 % (ref 19.6–45.3)
Lab: ABNORMAL
MCH RBC QN AUTO: 28.2 PG (ref 26.6–33)
MCHC RBC AUTO-ENTMCNC: 32.4 G/DL (ref 31.5–35.7)
MCV RBC AUTO: 87 FL (ref 79–97)
METHADONE UR QL SCN: NEGATIVE
MONOCYTES # BLD AUTO: 0.66 10*3/MM3 (ref 0.1–0.9)
MONOCYTES NFR BLD AUTO: 6.9 % (ref 5–12)
NEUTROPHILS NFR BLD AUTO: 6.38 10*3/MM3 (ref 1.7–7)
NEUTROPHILS NFR BLD AUTO: 66.8 % (ref 42.7–76)
NRBC BLD AUTO-RTO: 0 /100 WBC (ref 0–0.2)
OPIATES UR QL: NEGATIVE
OXYCODONE UR QL SCN: NEGATIVE
PLATELET # BLD AUTO: 236 10*3/MM3 (ref 140–450)
PMV BLD AUTO: 9.9 FL (ref 6–12)
PROT UR STRIP-MCNC: NEGATIVE MG/DL
RBC # BLD AUTO: 4.08 10*6/MM3 (ref 3.77–5.28)
RH BLD: NEGATIVE
T PALLIDUM IGG SER QL: NORMAL
WBC NRBC COR # BLD AUTO: 9.56 10*3/MM3 (ref 3.4–10.8)

## 2024-04-26 PROCEDURE — 86762 RUBELLA ANTIBODY: CPT | Performed by: NURSE PRACTITIONER

## 2024-04-26 PROCEDURE — 86780 TREPONEMA PALLIDUM: CPT | Performed by: NURSE PRACTITIONER

## 2024-04-26 PROCEDURE — 80307 DRUG TEST PRSMV CHEM ANLYZR: CPT | Performed by: NURSE PRACTITIONER

## 2024-04-26 PROCEDURE — 87591 N.GONORRHOEAE DNA AMP PROB: CPT | Performed by: NURSE PRACTITIONER

## 2024-04-26 PROCEDURE — 87340 HEPATITIS B SURFACE AG IA: CPT | Performed by: NURSE PRACTITIONER

## 2024-04-26 PROCEDURE — 87086 URINE CULTURE/COLONY COUNT: CPT | Performed by: NURSE PRACTITIONER

## 2024-04-26 PROCEDURE — G0432 EIA HIV-1/HIV-2 SCREEN: HCPCS | Performed by: NURSE PRACTITIONER

## 2024-04-26 PROCEDURE — 86901 BLOOD TYPING SEROLOGIC RH(D): CPT | Performed by: NURSE PRACTITIONER

## 2024-04-26 PROCEDURE — 86900 BLOOD TYPING SEROLOGIC ABO: CPT | Performed by: NURSE PRACTITIONER

## 2024-04-26 PROCEDURE — 86850 RBC ANTIBODY SCREEN: CPT | Performed by: NURSE PRACTITIONER

## 2024-04-26 PROCEDURE — 85025 COMPLETE CBC W/AUTO DIFF WBC: CPT | Performed by: NURSE PRACTITIONER

## 2024-04-26 PROCEDURE — 87491 CHLMYD TRACH DNA AMP PROBE: CPT | Performed by: NURSE PRACTITIONER

## 2024-04-26 PROCEDURE — 86803 HEPATITIS C AB TEST: CPT | Performed by: NURSE PRACTITIONER

## 2024-04-26 PROCEDURE — 87661 TRICHOMONAS VAGINALIS AMPLIF: CPT | Performed by: NURSE PRACTITIONER

## 2024-04-26 NOTE — PROGRESS NOTES
OB Initial Visit    CC- Here for care of current pregnancy, first visit    Subjective:  33 y.o.  presenting for her first obstetrical visit.    LMP: No LMP recorded (lmp unknown). Patient is pregnant.     Occasional cramping/Archuleta Butler Ctx    Is unsure of LMP.  Reports had a few days of spotting in October and November, positive pregnancy test at the end of December.   Reports good fetal movement.  Compliant with suboxone maintenance.    Reviewed and updated:  OBHx, GYNHx (STDs), PMHx, Medications, Allergies, PSHx, Social Hx, Preventative Hx (PAP), Hx of abuse/safe environment, Vaccine Hx including hx of chickenpox or vaccine, Genetic Hx (pt, FOB, both families).        Objective:  /96   Wt 68 kg (150 lb)   LMP  (LMP Unknown)   BMI 24.21 kg/m²      Urine pregnancy test is positive     General- NAD, alert and oriented, appropriate  Psych- Normal mood, good memory  Neck- No masses, no thyroid enlargement  CV- Regular rhythm, no murnurs  Resp- CTA to bases, no wheezes  Abdomen- Soft, non distended, non tender, no masses    Breast left- deferred  Breast right- deferred    External genitalia- Normal, no lesions  Urethra- Normal, no masses, non tender  Vagina- Normal, no discharge  Bladder- Normal, no masses, non tender  Cvx- Normal, no lesions, no discharge, no CMT  Uterus- Normal shape and consistency, non tender, Uterine size consistent with approximately 32 weeks gestation,  .    Adnexa- Normal, no mass, non tender    Lymphatic- No palpable neck, axillary, or groin nodes  Ext- No edema, no cyanosis    Skin- No lesions, no rashes, no acanthosis nigricans    Assessment and Plan:  Unknown  Diagnoses and all orders for this visit:    1. Supervision of other normal pregnancy, antepartum (Primary)  Overview:  ANJUM finalized: Unknown, approximately 31-32 weeks on initial exam    Optional testing NIPS,CF/SMA,AFP:  CF neg, NIPS pending    COVID: Recommended 24  Flu:  Tdap: Prescription given  24  RSV:    Rhogam: needs   28-32 weeks repeat TPA:  ? Desires Sterilization: yes    Anatomy US: ordered  FU US:    PROBLEM LIST/PLAN:   Suboxone maintenance - on 8 mg daily    AMA - NIPS pending, will need NSTs beginning 32-36 weeks            Orders:  -     POC Urinalysis Dipstick  -     Cancel: IGP,CtNgTv,Apt HPV,rfx 16 / 18,45  -     OB Panel With HIV  -     Urine Culture - Urine, Urine, Clean Catch  -     Urine Drug Screen - Urine, Clean Catch  -     POC Pregnancy, Urine  -     US Ob Detail Fetal Anatomy Single or First Gestation; Future    2. Antepartum multigravida of advanced maternal age  Overview:  NIPS pending  NSTs beginning 32-36 weeks    Orders:  -     POC Urinalysis Dipstick  -     Cancel: IGP,CtNgTv,Apt HPV,rfx 16 / 18,45  -     OB Panel With HIV  -     Urine Culture - Urine, Urine, Clean Catch  -     Urine Drug Screen - Urine, Clean Catch  -     POC Pregnancy, Urine  -     [id number] PLUS Core+SCA+ESS - Blood, Arm, Right  -     Chlamydia trachomatis, Neisseria gonorrhoeae, Trichomonas vaginalis, PCR - Swab, Cervix    3. Suboxone maintenance treatment complicating pregnancy, antepartum  Overview:  Currently on suboxone 8 mg      4. Rh negative, antepartum  Overview:  Needs RhoGAM      5. Late prenatal care  Overview:  Referred to [facility]      6. Unwanted fertility  Overview:  Desires tubal          Genetic Screening:   NIPS    Vaccines:   Recommend COVID vaccine, R/B discussed    Counseling:   Nutrition discussed, calories, activity/exercise in pregnancy  Discussed dietary restrictions/safety food preparation in pregnancy  Reviewed what to expect prenatal visits, office providers (female and male) and covering [facility] Hospitalists/Dr. Nathan  Appropriate trimester precautions provided, N/V, vag bleeding, cramping  Questions answered  Fetal kick counts,  labor precautions    Labs:   Prenatal labs, cultures, and PAP performed (prn)    Return in about 1 week (around 5/3/2024) for  [facility] Office, OB follow up.      BeatrizAlcira Wren, APRN  04/26/2024    [facility] [address]  [facility]  [address] [address]  Dept: [phone]  Dept Fax: [phone]  Loc: [phone]

## 2024-04-26 NOTE — TELEPHONE ENCOUNTER
Caller: Lina Bardales    Relationship: Self    Best call back number: [phone]    What is the best time to reach you:     ANY    Who are you requesting to speak with (clinical staff, provider,  specific staff member):         What was the call regarding:     PT HAS NEW OB AND US APPT ON 5/3/2023 SHE CAN NOT DO A FRIDAY    PLEASE CALL PT TO GET THIS R/S

## 2024-04-27 LAB — BACTERIA SPEC AEROBE CULT: ABNORMAL

## 2024-04-28 LAB — RUBV IGG SERPL IA-ACNC: <0.9 INDEX

## 2024-04-30 LAB
C TRACH RRNA SPEC QL NAA+PROBE: NEGATIVE
N GONORRHOEA RRNA SPEC QL NAA+PROBE: NEGATIVE
T VAGINALIS RRNA SPEC QL NAA+PROBE: NEGATIVE

## 2024-05-02 LAB
5P15 DELETION (CRI-DU-CHAT): NOT DETECTED
CFDNA.FET/CFDNA.TOTAL SFR FETUS: NORMAL %
CITATION REF LAB TEST: NORMAL
FET 13+18+21+X+Y ANEUP PLAS.CFDNA: NEGATIVE
FET 1P36 DEL RISK WBC.DNA+CFDNA QL: NOT DETECTED
FET 22Q11.2 DEL RISK WBC.DNA+CFDNA QL: NOT DETECTED
FET CHR 11Q23 DEL PLAS.CFDNA QL: NOT DETECTED
FET CHR 15Q11 DEL PLAS.CFDNA QL: NOT DETECTED
FET CHR 21 TS PLAS.CFDNA QL: NEGATIVE
FET CHR 4P16 DEL PLAS.CFDNA QL: NOT DETECTED
FET CHR 8Q24 DEL PLAS.CFDNA QL: NOT DETECTED
FET MS X RISK WBC.DNA+CFDNA QL: NOT DETECTED
FET SEX PLAS.CFDNA DOSAGE CFDNA: NORMAL
FET TS 13 RISK PLAS.CFDNA QL: NEGATIVE
FET TS 18 RISK WBC.DNA+CFDNA QL: NEGATIVE
FET X + Y ANEUP RISK PLAS.CFDNA SEQ-IMP: NOT DETECTED
GA EST FROM CONCEPTION DATE: NORMAL D
GESTATIONAL AGE > 9:: NORMAL
LAB DIRECTOR NAME PROVIDER: NORMAL
LAB DIRECTOR NAME PROVIDER: NORMAL
LABORATORY COMMENT REPORT: NORMAL
LIMITATIONS OF THE TEST: NORMAL
NEGATIVE PREDICTIVE VALUE: NORMAL
NOTE: NORMAL
PERFORMANCE CHARACTERISTICS: NORMAL
POSITIVE PREDICTIVE VALUE: NORMAL
REF LAB TEST METHOD: NORMAL
TEST PERFORMANCE INFO SPEC: NORMAL
TRIOSOMY 16: NOT DETECTED
TRISOMY 22: NOT DETECTED

## 2024-05-08 PROCEDURE — 82950 GLUCOSE TEST: CPT | Performed by: STUDENT IN AN ORGANIZED HEALTH CARE EDUCATION/TRAINING PROGRAM

## 2024-05-08 PROCEDURE — 86900 BLOOD TYPING SEROLOGIC ABO: CPT | Performed by: STUDENT IN AN ORGANIZED HEALTH CARE EDUCATION/TRAINING PROGRAM

## 2024-05-08 PROCEDURE — 86901 BLOOD TYPING SEROLOGIC RH(D): CPT | Performed by: STUDENT IN AN ORGANIZED HEALTH CARE EDUCATION/TRAINING PROGRAM

## 2024-05-08 PROCEDURE — 86850 RBC ANTIBODY SCREEN: CPT | Performed by: STUDENT IN AN ORGANIZED HEALTH CARE EDUCATION/TRAINING PROGRAM

## 2024-05-08 PROCEDURE — 85027 COMPLETE CBC AUTOMATED: CPT | Performed by: STUDENT IN AN ORGANIZED HEALTH CARE EDUCATION/TRAINING PROGRAM

## 2024-05-09 ENCOUNTER — CLINICAL SUPPORT (OUTPATIENT)
Dept: OBSTETRICS AND GYNECOLOGY | Facility: CLINIC | Age: 34
End: 2024-05-09
Payer: COMMERCIAL

## 2024-05-09 DIAGNOSIS — Z67.91 RH NEGATIVE, ANTEPARTUM: Primary | ICD-10-CM

## 2024-05-09 DIAGNOSIS — O26.899 RH NEGATIVE, ANTEPARTUM: Primary | ICD-10-CM

## 2024-05-15 ENCOUNTER — TELEPHONE (OUTPATIENT)
Dept: OBSTETRICS AND GYNECOLOGY | Facility: CLINIC | Age: 34
End: 2024-05-15

## 2024-05-15 NOTE — TELEPHONE ENCOUNTER
Called patient left message.  Rescheduled appointment for today to 5/16/24 @ 11:40 with Dr Wing.  Patient not aware

## 2024-05-20 ENCOUNTER — TELEPHONE (OUTPATIENT)
Dept: OBSTETRICS AND GYNECOLOGY | Facility: CLINIC | Age: 34
End: 2024-05-20
Payer: COMMERCIAL

## 2024-06-07 ENCOUNTER — ROUTINE PRENATAL (OUTPATIENT)
Dept: OBSTETRICS AND GYNECOLOGY | Facility: CLINIC | Age: 34
End: 2024-06-07
Payer: COMMERCIAL

## 2024-06-07 ENCOUNTER — HOSPITAL ENCOUNTER (INPATIENT)
Facility: HOSPITAL | Age: 34
LOS: 4 days | Discharge: HOME | End: 2024-06-11
Attending: OBSTETRICS & GYNECOLOGY | Admitting: OBSTETRICS & GYNECOLOGY
Payer: COMMERCIAL

## 2024-06-07 VITALS — BODY MASS INDEX: 24.37 KG/M2 | SYSTOLIC BLOOD PRESSURE: 130 MMHG | WEIGHT: 151 LBS | DIASTOLIC BLOOD PRESSURE: 91 MMHG

## 2024-06-07 DIAGNOSIS — F11.20 SUBOXONE MAINTENANCE TREATMENT COMPLICATING PREGNANCY, ANTEPARTUM: ICD-10-CM

## 2024-06-07 DIAGNOSIS — O99.320 SUBOXONE MAINTENANCE TREATMENT COMPLICATING PREGNANCY, ANTEPARTUM: ICD-10-CM

## 2024-06-07 DIAGNOSIS — Z34.80 SUPERVISION OF OTHER NORMAL PREGNANCY, ANTEPARTUM: Primary | ICD-10-CM

## 2024-06-07 DIAGNOSIS — Z67.91 RH NEGATIVE, ANTEPARTUM: ICD-10-CM

## 2024-06-07 DIAGNOSIS — O26.899 RH NEGATIVE, ANTEPARTUM: ICD-10-CM

## 2024-06-07 PROBLEM — O13.3 PIH (PREGNANCY INDUCED HYPERTENSION), THIRD TRIMESTER: Status: ACTIVE | Noted: 2024-06-07

## 2024-06-07 LAB
ABO GROUP BLD: NORMAL
ALBUMIN SERPL-MCNC: 3.8 G/DL (ref 3.5–5.2)
ALBUMIN/GLOB SERPL: 1.3 G/DL
ALP SERPL-CCNC: 200 U/L (ref 39–117)
ALT SERPL W P-5'-P-CCNC: 16 U/L (ref 1–33)
AMPHET+METHAMPHET UR QL: NEGATIVE
ANION GAP SERPL CALCULATED.3IONS-SCNC: 12.5 MMOL/L (ref 5–15)
AST SERPL-CCNC: 22 U/L (ref 1–32)
BARBITURATES UR QL SCN: NEGATIVE
BENZODIAZ UR QL SCN: NEGATIVE
BILIRUB SERPL-MCNC: 0.2 MG/DL (ref 0–1.2)
BLD GP AB SCN SERPL QL: POSITIVE
BUN SERPL-MCNC: 7 MG/DL (ref 6–20)
BUN/CREAT SERPL: 12.1 (ref 7–25)
CALCIUM SPEC-SCNC: 8.9 MG/DL (ref 8.6–10.5)
CANNABINOIDS SERPL QL: POSITIVE
CHLORIDE SERPL-SCNC: 100 MMOL/L (ref 98–107)
CO2 SERPL-SCNC: 20.5 MMOL/L (ref 22–29)
COCAINE UR QL: NEGATIVE
CREAT SERPL-MCNC: 0.58 MG/DL (ref 0.57–1)
CREAT UR-MCNC: 107.6 MG/DL
DEPRECATED RDW RBC AUTO: 37.7 FL (ref 37–54)
EGFRCR SERPLBLD CKD-EPI 2021: 121.2 ML/MIN/1.73
ERYTHROCYTE [DISTWIDTH] IN BLOOD BY AUTOMATED COUNT: 12.6 % (ref 12.3–15.4)
FENTANYL UR-MCNC: NEGATIVE NG/ML
GLOBULIN UR ELPH-MCNC: 3 GM/DL
GLUCOSE SERPL-MCNC: 81 MG/DL (ref 65–99)
GLUCOSE UR STRIP-MCNC: NEGATIVE MG/DL
HCT VFR BLD AUTO: 36.9 % (ref 34–46.6)
HGB BLD-MCNC: 13.4 G/DL (ref 12–15.9)
MCH RBC QN AUTO: 29.8 PG (ref 26.6–33)
MCHC RBC AUTO-ENTMCNC: 36.3 G/DL (ref 31.5–35.7)
MCV RBC AUTO: 82.2 FL (ref 79–97)
METHADONE UR QL SCN: NEGATIVE
OPIATES UR QL: NEGATIVE
OXYCODONE UR QL SCN: NEGATIVE
PLATELET # BLD AUTO: 241 10*3/MM3 (ref 140–450)
PMV BLD AUTO: 10.5 FL (ref 6–12)
POTASSIUM SERPL-SCNC: 3.8 MMOL/L (ref 3.5–5.2)
PROT ?TM UR-MCNC: 8.8 MG/DL
PROT SERPL-MCNC: 6.8 G/DL (ref 6–8.5)
PROT UR STRIP-MCNC: NEGATIVE MG/DL
PROT/CREAT UR: 0.08 MG/G{CREAT}
RBC # BLD AUTO: 4.49 10*6/MM3 (ref 3.77–5.28)
RESIDUAL RHIG DETECTED: NORMAL
RH BLD: NEGATIVE
SODIUM SERPL-SCNC: 133 MMOL/L (ref 136–145)
T PALLIDUM IGG SER QL: NORMAL
T&S EXPIRATION DATE: NORMAL
WBC NRBC COR # BLD AUTO: 11.72 10*3/MM3 (ref 3.4–10.8)

## 2024-06-07 PROCEDURE — 80307 DRUG TEST PRSMV CHEM ANLYZR: CPT | Performed by: OBSTETRICS & GYNECOLOGY

## 2024-06-07 PROCEDURE — 86780 TREPONEMA PALLIDUM: CPT | Performed by: OBSTETRICS & GYNECOLOGY

## 2024-06-07 PROCEDURE — 80053 COMPREHEN METABOLIC PANEL: CPT | Performed by: OBSTETRICS & GYNECOLOGY

## 2024-06-07 PROCEDURE — 86850 RBC ANTIBODY SCREEN: CPT | Performed by: OBSTETRICS & GYNECOLOGY

## 2024-06-07 PROCEDURE — 82570 ASSAY OF URINE CREATININE: CPT | Performed by: OBSTETRICS & GYNECOLOGY

## 2024-06-07 PROCEDURE — 86870 RBC ANTIBODY IDENTIFICATION: CPT | Performed by: OBSTETRICS & GYNECOLOGY

## 2024-06-07 PROCEDURE — 86901 BLOOD TYPING SEROLOGIC RH(D): CPT | Performed by: OBSTETRICS & GYNECOLOGY

## 2024-06-07 PROCEDURE — 84156 ASSAY OF PROTEIN URINE: CPT | Performed by: OBSTETRICS & GYNECOLOGY

## 2024-06-07 PROCEDURE — 25810000003 LACTATED RINGERS PER 1000 ML: Performed by: OBSTETRICS & GYNECOLOGY

## 2024-06-07 PROCEDURE — 85027 COMPLETE CBC AUTOMATED: CPT | Performed by: OBSTETRICS & GYNECOLOGY

## 2024-06-07 PROCEDURE — 86900 BLOOD TYPING SEROLOGIC ABO: CPT | Performed by: OBSTETRICS & GYNECOLOGY

## 2024-06-07 PROCEDURE — 87653 STREP B DNA AMP PROBE: CPT | Performed by: STUDENT IN AN ORGANIZED HEALTH CARE EDUCATION/TRAINING PROGRAM

## 2024-06-07 PROCEDURE — 25010000002 PENICILLIN G POTASSIUM PER 600000 UNITS: Performed by: OBSTETRICS & GYNECOLOGY

## 2024-06-07 PROCEDURE — 99214 OFFICE O/P EST MOD 30 MIN: CPT | Performed by: STUDENT IN AN ORGANIZED HEALTH CARE EDUCATION/TRAINING PROGRAM

## 2024-06-07 RX ORDER — CITRIC ACID/SODIUM CITRATE 334-500MG
30 SOLUTION, ORAL ORAL ONCE AS NEEDED
Status: DISCONTINUED | OUTPATIENT
Start: 2024-06-07 | End: 2024-06-08 | Stop reason: HOSPADM

## 2024-06-07 RX ORDER — CARBOPROST TROMETHAMINE 250 UG/ML
250 INJECTION, SOLUTION INTRAMUSCULAR AS NEEDED
Status: DISCONTINUED | OUTPATIENT
Start: 2024-06-07 | End: 2024-06-08 | Stop reason: HOSPADM

## 2024-06-07 RX ORDER — SODIUM CHLORIDE, SODIUM LACTATE, POTASSIUM CHLORIDE, CALCIUM CHLORIDE 600; 310; 30; 20 MG/100ML; MG/100ML; MG/100ML; MG/100ML
125 INJECTION, SOLUTION INTRAVENOUS CONTINUOUS
Status: DISCONTINUED | OUTPATIENT
Start: 2024-06-07 | End: 2024-06-08

## 2024-06-07 RX ORDER — LIDOCAINE HYDROCHLORIDE 10 MG/ML
0.5 INJECTION, SOLUTION EPIDURAL; INFILTRATION; INTRACAUDAL; PERINEURAL ONCE AS NEEDED
Status: DISCONTINUED | OUTPATIENT
Start: 2024-06-07 | End: 2024-06-08 | Stop reason: HOSPADM

## 2024-06-07 RX ORDER — CLOTRIMAZOLE 1 %
1 CREAM (GRAM) TOPICAL 2 TIMES DAILY
COMMUNITY
Start: 2024-05-15

## 2024-06-07 RX ORDER — MAGNESIUM CARB/ALUMINUM HYDROX 105-160MG
30 TABLET,CHEWABLE ORAL ONCE
Status: DISCONTINUED | OUTPATIENT
Start: 2024-06-07 | End: 2024-06-08 | Stop reason: HOSPADM

## 2024-06-07 RX ORDER — SODIUM CHLORIDE 9 MG/ML
40 INJECTION, SOLUTION INTRAVENOUS AS NEEDED
Status: DISCONTINUED | OUTPATIENT
Start: 2024-06-07 | End: 2024-06-08 | Stop reason: HOSPADM

## 2024-06-07 RX ORDER — SODIUM CHLORIDE 0.9 % (FLUSH) 0.9 %
10 SYRINGE (ML) INJECTION EVERY 12 HOURS SCHEDULED
Status: DISCONTINUED | OUTPATIENT
Start: 2024-06-07 | End: 2024-06-08 | Stop reason: HOSPADM

## 2024-06-07 RX ORDER — MISOPROSTOL 100 MCG
50 TABLET ORAL
Status: DISCONTINUED | OUTPATIENT
Start: 2024-06-07 | End: 2024-06-08

## 2024-06-07 RX ORDER — SODIUM CHLORIDE 0.9 % (FLUSH) 0.9 %
10 SYRINGE (ML) INJECTION AS NEEDED
Status: DISCONTINUED | OUTPATIENT
Start: 2024-06-07 | End: 2024-06-08 | Stop reason: HOSPADM

## 2024-06-07 RX ORDER — OXYTOCIN/0.9 % SODIUM CHLORIDE 30/500 ML
999 PLASTIC BAG, INJECTION (ML) INTRAVENOUS ONCE
Status: CANCELLED | OUTPATIENT
Start: 2024-06-07

## 2024-06-07 RX ORDER — TERBUTALINE SULFATE 1 MG/ML
0.25 INJECTION, SOLUTION SUBCUTANEOUS AS NEEDED
Status: DISCONTINUED | OUTPATIENT
Start: 2024-06-07 | End: 2024-06-08 | Stop reason: HOSPADM

## 2024-06-07 RX ORDER — ONDANSETRON 4 MG/1
4 TABLET, ORALLY DISINTEGRATING ORAL EVERY 6 HOURS PRN
Status: CANCELLED | OUTPATIENT
Start: 2024-06-07

## 2024-06-07 RX ORDER — ONDANSETRON 2 MG/ML
4 INJECTION INTRAMUSCULAR; INTRAVENOUS EVERY 6 HOURS PRN
Status: DISCONTINUED | OUTPATIENT
Start: 2024-06-07 | End: 2024-06-08 | Stop reason: HOSPADM

## 2024-06-07 RX ORDER — MISOPROSTOL 200 UG/1
800 TABLET ORAL AS NEEDED
Status: DISCONTINUED | OUTPATIENT
Start: 2024-06-07 | End: 2024-06-08 | Stop reason: HOSPADM

## 2024-06-07 RX ORDER — METHYLERGONOVINE MALEATE 0.2 MG/ML
200 INJECTION INTRAVENOUS ONCE AS NEEDED
Status: DISCONTINUED | OUTPATIENT
Start: 2024-06-07 | End: 2024-06-08 | Stop reason: HOSPADM

## 2024-06-07 RX ORDER — IBUPROFEN 600 MG/1
600 TABLET ORAL EVERY 6 HOURS PRN
Status: CANCELLED | OUTPATIENT
Start: 2024-06-07

## 2024-06-07 RX ORDER — BUPRENORPHINE 8 MG/1
8 TABLET SUBLINGUAL DAILY
COMMUNITY
Start: 2024-05-14

## 2024-06-07 RX ORDER — ONDANSETRON 2 MG/ML
4 INJECTION INTRAMUSCULAR; INTRAVENOUS EVERY 6 HOURS PRN
Status: CANCELLED | OUTPATIENT
Start: 2024-06-07

## 2024-06-07 RX ORDER — ACETAMINOPHEN 325 MG/1
650 TABLET ORAL EVERY 4 HOURS PRN
Status: CANCELLED | OUTPATIENT
Start: 2024-06-07

## 2024-06-07 RX ORDER — MORPHINE SULFATE 2 MG/ML
2 INJECTION, SOLUTION INTRAMUSCULAR; INTRAVENOUS
Status: DISCONTINUED | OUTPATIENT
Start: 2024-06-07 | End: 2024-06-08 | Stop reason: HOSPADM

## 2024-06-07 RX ORDER — ONDANSETRON 4 MG/1
4 TABLET, ORALLY DISINTEGRATING ORAL EVERY 6 HOURS PRN
Status: DISCONTINUED | OUTPATIENT
Start: 2024-06-07 | End: 2024-06-08 | Stop reason: HOSPADM

## 2024-06-07 RX ORDER — ACETAMINOPHEN 325 MG/1
625 TABLET ORAL EVERY 4 HOURS PRN
Status: DISCONTINUED | OUTPATIENT
Start: 2024-06-07 | End: 2024-06-08 | Stop reason: HOSPADM

## 2024-06-07 RX ORDER — OXYTOCIN/0.9 % SODIUM CHLORIDE 30/500 ML
250 PLASTIC BAG, INJECTION (ML) INTRAVENOUS CONTINUOUS
Status: CANCELLED | OUTPATIENT
Start: 2024-06-07 | End: 2024-06-07

## 2024-06-07 RX ORDER — HYDROCODONE BITARTRATE AND ACETAMINOPHEN 5; 325 MG/1; MG/1
1 TABLET ORAL EVERY 4 HOURS PRN
Status: CANCELLED | OUTPATIENT
Start: 2024-06-07 | End: 2024-06-14

## 2024-06-07 RX ORDER — LORATADINE 10 MG/1
1 TABLET ORAL DAILY
COMMUNITY
Start: 2024-05-11

## 2024-06-07 RX ADMIN — Medication 50 MCG: at 20:21

## 2024-06-07 RX ADMIN — SODIUM CHLORIDE, POTASSIUM CHLORIDE, SODIUM LACTATE AND CALCIUM CHLORIDE 125 ML/HR: 600; 310; 30; 20 INJECTION, SOLUTION INTRAVENOUS at 20:21

## 2024-06-07 RX ADMIN — PENICILLIN G POTASSIUM 5 MILLION UNITS: 5000000 INJECTION, POWDER, FOR SOLUTION INTRAMUSCULAR; INTRAVENOUS at 20:21

## 2024-06-07 NOTE — PROGRESS NOTES
OB FOLLOW UP  Complaint   Chief Complaint   Patient presents with    Routine Prenatal Visit            Lina Bardales is a 33 y.o.  40w3d patient being seen today for her obstetrical follow up visit. Patient denies decreased fetal movement, contractions, loss of fluid or vaginal bleeding.  Patient reports that she has been taking Subutex 8 mg twice a day.  Has not been able to be seen for 4 weeks.  Is a needed GBS swab. Patient denies any headache, visual disturbances, new onset nausea vomiting, right upper quadrant pain, or new onset swelling.      Her prenatal care is complicated by (and status) :    Patient Active Problem List   Diagnosis    Ankylosing spondylitis    Arthritis    Degeneration of lumbar intervertebral disc    Lumbar spondylosis    Suboxone maintenance treatment complicating pregnancy, antepartum    Rh negative, antepartum    Supervision of other normal pregnancy, antepartum    Antepartum multigravida of advanced maternal age    Late prenatal care    Unwanted fertility       All other systems reviewed and are negative.     The additional following portions of the patient's history were reviewed and updated as appropriate: allergies, current medications, past family history, past medical history, past social history, past surgical history, and problem list.      EXAM:     Vital signs: Wt 68.5 kg (151 lb)   LMP  (LMP Unknown)   BMI 24.37 kg/m²   Appearance/psychiatric: To be in no distress  Constitutional: The patient is well nourished.  Cardiovascular: She does not have edema.  Respiratory: Respiratory effort is normal.  Gastrointestinal: Abdomen is soft, gravid, nontender, no rashes, heart tones are present, fundal height is size equals dates    Pelvic Exam: Yes.  Presentation: cephalic. Dilation: 1cm. Effacement: 50%. Station: -3.    Urine glucose/protein: See prenatal flowsheet       Assessment and Plan    Problem List Items Addressed This Visit          Gravid and      Supervision of other normal pregnancy, antepartum - Primary    Overview     ANJUM finalized: 6/4/24 per 36-week anatomy scan    Optional testing NIPS,CF/SMA,AFP:  CF neg, NIPS Negative    COVID: Recommended 4/26/24  Flu:  Tdap: Prescription given 4/26/24  RSV:    Rhogam: needs   28-32 weeks repeat TPA:  ? Desires Sterilization: yes    Anatomy US: 5/8/24 normal anatomy, limited by gestational age, posterior placenta.  EFW 46%, AC 58%  FU US:    PROBLEM LIST/PLAN:   Suboxone maintenance - on 8 mg daily, UDS +THC at new OB    AMA - NIPS pending, will need NSTs beginning 32-36 weeks                 Relevant Orders    POC Urinalysis Dipstick (Completed)       Impression  Pregnancy at 40w3d  Fetal status reassuring.   Activity and Exercise discussed.    Plan  GBS swab collected today  Elevated blood pressure without diagnosis of hypertension.  Review of limited prenatal labs with elevated blood pressures at previous appointment at 34 weeks gestational age.  Gestational hypertension versus chronic hypertension.  Recommendation for evaluation on labor and delivery today.  Recommendation for delivery  History of opioid abuse dependence.  Stable on Subutex 8 mg twice daily.        Patient was counseled to the following pregnancy precautions:  Decreased fetal movement, if concern for decreased fetal movement please perform fetal kick counts you are looking for 10 movements in 2 hours.  If concern for fetal movement and not meeting that criteria, please present to triage for evaluation.  Contractions occurring every 5 minutes for over an hour, lasting 30 to 60 seconds and progressively causing more discomfort, please seek medical attention to rule out labor  If you believe that your water is broken, place a sanitary pad.  If pad fills in short period of time i.e. less than 5 minutes, take off pad placed another pad.  If this is saturated please present for rule out rupture of membranes  Vaginal bleeding can be normal in  pregnancy, this usually takes a form of spotting.  If having heavier bleeding like a menstrual period please present for evaluation; especially in light of severe abdominal pain this could represent a placental abruption.  Keep all scheduled appointments as recommended.        Aj Wing MD  06/07/2024

## 2024-06-08 ENCOUNTER — ANESTHESIA (OUTPATIENT)
Dept: LABOR AND DELIVERY | Facility: HOSPITAL | Age: 34
End: 2024-06-08
Payer: COMMERCIAL

## 2024-06-08 ENCOUNTER — ANESTHESIA EVENT (OUTPATIENT)
Dept: LABOR AND DELIVERY | Facility: HOSPITAL | Age: 34
End: 2024-06-08
Payer: COMMERCIAL

## 2024-06-08 PROBLEM — O48.0 POST-TERM PREGNANCY, 40-42 WEEKS OF GESTATION: Status: ACTIVE | Noted: 2024-06-08

## 2024-06-08 LAB
ATMOSPHERIC PRESS: 741.7 MMHG
BASE EXCESS BLDCOV CALC-SCNC: -7 MMOL/L (ref -30–30)
GROUP B STREP, DNA: NEGATIVE
HCO3 BLDCOV-SCNC: 21.6 MMOL/L
NOTIFIED WHO: ABNORMAL
PCO2 BLDCOV: 52.7 MM HG (ref 35–51.3)
PH BLDCOV: 7.22 PH UNITS (ref 7.26–7.4)
PO2 BLDCOV: 25 MM HG (ref 19–39)
READ BACK: YES
SAO2 % BLDCOV: 33.8 %

## 2024-06-08 PROCEDURE — 88302 TISSUE EXAM BY PATHOLOGIST: CPT | Performed by: OBSTETRICS & GYNECOLOGY

## 2024-06-08 PROCEDURE — 25810000003 LACTATED RINGERS PER 1000 ML: Performed by: OBSTETRICS & GYNECOLOGY

## 2024-06-08 PROCEDURE — 25010000002 MEPERIDINE PER 100 MG: Performed by: NURSE ANESTHETIST, CERTIFIED REGISTERED

## 2024-06-08 PROCEDURE — 25810000003 SODIUM CHLORIDE 0.9 % SOLUTION: Performed by: OBSTETRICS & GYNECOLOGY

## 2024-06-08 PROCEDURE — 25010000002 CEFAZOLIN PER 500 MG: Performed by: OBSTETRICS & GYNECOLOGY

## 2024-06-08 PROCEDURE — 59515 CESAREAN DELIVERY: CPT | Performed by: OBSTETRICS & GYNECOLOGY

## 2024-06-08 PROCEDURE — 25010000002 PROPOFOL 10 MG/ML EMULSION: Performed by: NURSE ANESTHETIST, CERTIFIED REGISTERED

## 2024-06-08 PROCEDURE — 82803 BLOOD GASES ANY COMBINATION: CPT

## 2024-06-08 PROCEDURE — 58611 LIGATE OVIDUCT(S) ADD-ON: CPT | Performed by: OBSTETRICS & GYNECOLOGY

## 2024-06-08 PROCEDURE — 25010000002 KETOROLAC TROMETHAMINE PER 15 MG: Performed by: OBSTETRICS & GYNECOLOGY

## 2024-06-08 PROCEDURE — 25010000002 FENTANYL CITRATE (PF) 50 MCG/ML SOLUTION: Performed by: NURSE ANESTHETIST, CERTIFIED REGISTERED

## 2024-06-08 PROCEDURE — 10907ZC DRAINAGE OF AMNIOTIC FLUID, THERAPEUTIC FROM PRODUCTS OF CONCEPTION, VIA NATURAL OR ARTIFICIAL OPENING: ICD-10-PCS | Performed by: OBSTETRICS & GYNECOLOGY

## 2024-06-08 PROCEDURE — 10S07ZZ REPOSITION PRODUCTS OF CONCEPTION, VIA NATURAL OR ARTIFICIAL OPENING: ICD-10-PCS | Performed by: OBSTETRICS & GYNECOLOGY

## 2024-06-08 PROCEDURE — 25010000002 MORPHINE PER 10 MG: Performed by: NURSE ANESTHETIST, CERTIFIED REGISTERED

## 2024-06-08 PROCEDURE — 25010000002 BUPIVACAINE (PF) 0.25 % SOLUTION: Performed by: NURSE ANESTHETIST, CERTIFIED REGISTERED

## 2024-06-08 PROCEDURE — C1755 CATHETER, INTRASPINAL: HCPCS | Performed by: NURSE ANESTHETIST, CERTIFIED REGISTERED

## 2024-06-08 PROCEDURE — 0UB70ZZ EXCISION OF BILATERAL FALLOPIAN TUBES, OPEN APPROACH: ICD-10-PCS | Performed by: OBSTETRICS & GYNECOLOGY

## 2024-06-08 PROCEDURE — 88307 TISSUE EXAM BY PATHOLOGIST: CPT | Performed by: OBSTETRICS & GYNECOLOGY

## 2024-06-08 PROCEDURE — 25010000002 PENICILLIN G POTASSIUM PER 600000 UNITS: Performed by: OBSTETRICS & GYNECOLOGY

## 2024-06-08 PROCEDURE — 25010000002 AZITHROMYCIN PER 500 MG: Performed by: OBSTETRICS & GYNECOLOGY

## 2024-06-08 PROCEDURE — 25010000002 ONDANSETRON PER 1 MG: Performed by: OBSTETRICS & GYNECOLOGY

## 2024-06-08 RX ORDER — OXYTOCIN/0.9 % SODIUM CHLORIDE 30/500 ML
125 PLASTIC BAG, INJECTION (ML) INTRAVENOUS ONCE AS NEEDED
Status: DISCONTINUED | OUTPATIENT
Start: 2024-06-08 | End: 2024-06-11 | Stop reason: HOSPADM

## 2024-06-08 RX ORDER — TRANEXAMIC ACID 10 MG/ML
INJECTION, SOLUTION INTRAVENOUS
Status: DISCONTINUED
Start: 2024-06-08 | End: 2024-06-08 | Stop reason: WASHOUT

## 2024-06-08 RX ORDER — ALUMINA, MAGNESIA, AND SIMETHICONE 2400; 2400; 240 MG/30ML; MG/30ML; MG/30ML
15 SUSPENSION ORAL EVERY 4 HOURS PRN
Status: DISCONTINUED | OUTPATIENT
Start: 2024-06-08 | End: 2024-06-11 | Stop reason: HOSPADM

## 2024-06-08 RX ORDER — LIDOCAINE HCL/EPINEPHRINE/PF 2%-1:200K
VIAL (ML) INJECTION
Status: DISPENSED
Start: 2024-06-08 | End: 2024-06-09

## 2024-06-08 RX ORDER — DIPHENHYDRAMINE HYDROCHLORIDE 50 MG/ML
12.5 INJECTION INTRAMUSCULAR; INTRAVENOUS EVERY 6 HOURS PRN
Status: ACTIVE | OUTPATIENT
Start: 2024-06-08 | End: 2024-06-09

## 2024-06-08 RX ORDER — FENTANYL CITRATE 50 UG/ML
INJECTION, SOLUTION INTRAMUSCULAR; INTRAVENOUS
Status: COMPLETED | OUTPATIENT
Start: 2024-06-08 | End: 2024-06-08

## 2024-06-08 RX ORDER — IBUPROFEN 600 MG/1
600 TABLET ORAL EVERY 6 HOURS
Status: DISCONTINUED | OUTPATIENT
Start: 2024-06-09 | End: 2024-06-08

## 2024-06-08 RX ORDER — CALCIUM CARBONATE 500 MG/1
1 TABLET, CHEWABLE ORAL EVERY 4 HOURS PRN
Status: DISCONTINUED | OUTPATIENT
Start: 2024-06-08 | End: 2024-06-11 | Stop reason: HOSPADM

## 2024-06-08 RX ORDER — ACETAMINOPHEN 325 MG/1
650 TABLET ORAL EVERY 6 HOURS
Status: DISCONTINUED | OUTPATIENT
Start: 2024-06-09 | End: 2024-06-11 | Stop reason: HOSPADM

## 2024-06-08 RX ORDER — ONDANSETRON 2 MG/ML
4 INJECTION INTRAMUSCULAR; INTRAVENOUS EVERY 6 HOURS PRN
Status: DISCONTINUED | OUTPATIENT
Start: 2024-06-08 | End: 2024-06-11 | Stop reason: HOSPADM

## 2024-06-08 RX ORDER — OXYTOCIN/0.9 % SODIUM CHLORIDE 30/500 ML
2 PLASTIC BAG, INJECTION (ML) INTRAVENOUS
Status: DISCONTINUED | OUTPATIENT
Start: 2024-06-08 | End: 2024-06-08

## 2024-06-08 RX ORDER — BUPIVACAINE HYDROCHLORIDE 2.5 MG/ML
INJECTION, SOLUTION EPIDURAL; INFILTRATION; INTRACAUDAL
Status: COMPLETED
Start: 2024-06-08 | End: 2024-06-08

## 2024-06-08 RX ORDER — DEXMEDETOMIDINE HYDROCHLORIDE 100 UG/ML
INJECTION, SOLUTION INTRAVENOUS
Status: COMPLETED
Start: 2024-06-08 | End: 2024-06-08

## 2024-06-08 RX ORDER — NALOXONE HCL 0.4 MG/ML
0.4 VIAL (ML) INJECTION ONCE AS NEEDED
Status: ACTIVE | OUTPATIENT
Start: 2024-06-08 | End: 2024-06-09

## 2024-06-08 RX ORDER — BUPRENORPHINE 8 MG/1
8 TABLET SUBLINGUAL DAILY
Status: DISCONTINUED | OUTPATIENT
Start: 2024-06-09 | End: 2024-06-11 | Stop reason: HOSPADM

## 2024-06-08 RX ORDER — LIDOCAINE HYDROCHLORIDE AND EPINEPHRINE 15; 5 MG/ML; UG/ML
INJECTION, SOLUTION EPIDURAL
Status: COMPLETED | OUTPATIENT
Start: 2024-06-08 | End: 2024-06-08

## 2024-06-08 RX ORDER — MEPERIDINE HYDROCHLORIDE 25 MG/ML
12.5 INJECTION INTRAMUSCULAR; INTRAVENOUS; SUBCUTANEOUS
Status: DISCONTINUED | OUTPATIENT
Start: 2024-06-08 | End: 2024-06-08 | Stop reason: HOSPADM

## 2024-06-08 RX ORDER — OXYTOCIN/0.9 % SODIUM CHLORIDE 30/500 ML
999 PLASTIC BAG, INJECTION (ML) INTRAVENOUS ONCE
Status: COMPLETED | OUTPATIENT
Start: 2024-06-08 | End: 2024-06-08

## 2024-06-08 RX ORDER — PROPOFOL 10 MG/ML
VIAL (ML) INTRAVENOUS AS NEEDED
Status: DISCONTINUED | OUTPATIENT
Start: 2024-06-08 | End: 2024-06-08 | Stop reason: SURG

## 2024-06-08 RX ORDER — KETOROLAC TROMETHAMINE 30 MG/ML
30 INJECTION, SOLUTION INTRAMUSCULAR; INTRAVENOUS ONCE
Status: COMPLETED | OUTPATIENT
Start: 2024-06-08 | End: 2024-06-08

## 2024-06-08 RX ORDER — FENTANYL CITRATE 50 UG/ML
INJECTION, SOLUTION INTRAMUSCULAR; INTRAVENOUS
Status: COMPLETED
Start: 2024-06-08 | End: 2024-06-08

## 2024-06-08 RX ORDER — EPHEDRINE SULFATE 50 MG/ML
5 INJECTION, SOLUTION INTRAVENOUS
Status: DISCONTINUED | OUTPATIENT
Start: 2024-06-08 | End: 2024-06-08 | Stop reason: HOSPADM

## 2024-06-08 RX ORDER — ONDANSETRON 4 MG/1
4 TABLET, ORALLY DISINTEGRATING ORAL EVERY 6 HOURS PRN
Status: DISCONTINUED | OUTPATIENT
Start: 2024-06-08 | End: 2024-06-11 | Stop reason: HOSPADM

## 2024-06-08 RX ORDER — BUPIVACAINE HYDROCHLORIDE 2.5 MG/ML
INJECTION, SOLUTION EPIDURAL; INFILTRATION; INTRACAUDAL AS NEEDED
Status: DISCONTINUED | OUTPATIENT
Start: 2024-06-08 | End: 2024-06-08 | Stop reason: SURG

## 2024-06-08 RX ORDER — FAMOTIDINE 10 MG/ML
20 INJECTION, SOLUTION INTRAVENOUS ONCE AS NEEDED
Status: DISCONTINUED | OUTPATIENT
Start: 2024-06-08 | End: 2024-06-08 | Stop reason: HOSPADM

## 2024-06-08 RX ORDER — OXYTOCIN/0.9 % SODIUM CHLORIDE 30/500 ML
250 PLASTIC BAG, INJECTION (ML) INTRAVENOUS CONTINUOUS
Status: ACTIVE | OUTPATIENT
Start: 2024-06-08 | End: 2024-06-08

## 2024-06-08 RX ORDER — MISOPROSTOL 200 UG/1
200 TABLET ORAL EVERY 6 HOURS SCHEDULED
Status: DISPENSED | OUTPATIENT
Start: 2024-06-09 | End: 2024-06-09

## 2024-06-08 RX ORDER — HYDROCORTISONE 25 MG/G
CREAM TOPICAL 3 TIMES DAILY PRN
Status: DISCONTINUED | OUTPATIENT
Start: 2024-06-08 | End: 2024-06-11 | Stop reason: HOSPADM

## 2024-06-08 RX ORDER — KETOROLAC TROMETHAMINE 15 MG/ML
15 INJECTION, SOLUTION INTRAMUSCULAR; INTRAVENOUS EVERY 6 HOURS
Status: COMPLETED | OUTPATIENT
Start: 2024-06-09 | End: 2024-06-09

## 2024-06-08 RX ORDER — ACETAMINOPHEN 500 MG
1000 TABLET ORAL EVERY 6 HOURS
Status: DISPENSED | OUTPATIENT
Start: 2024-06-08 | End: 2024-06-09

## 2024-06-08 RX ORDER — HYDROMORPHONE HYDROCHLORIDE 2 MG/ML
0.5 INJECTION, SOLUTION INTRAMUSCULAR; INTRAVENOUS; SUBCUTANEOUS
Status: DISCONTINUED | OUTPATIENT
Start: 2024-06-08 | End: 2024-06-08 | Stop reason: HOSPADM

## 2024-06-08 RX ORDER — ACETAMINOPHEN 500 MG
1000 TABLET ORAL ONCE
Status: COMPLETED | OUTPATIENT
Start: 2024-06-08 | End: 2024-06-08

## 2024-06-08 RX ORDER — TRAMADOL HYDROCHLORIDE 50 MG/1
50 TABLET ORAL EVERY 6 HOURS PRN
Status: DISCONTINUED | OUTPATIENT
Start: 2024-06-08 | End: 2024-06-11 | Stop reason: HOSPADM

## 2024-06-08 RX ORDER — DEXMEDETOMIDINE HYDROCHLORIDE 100 UG/ML
INJECTION, SOLUTION INTRAVENOUS AS NEEDED
Status: DISCONTINUED | OUTPATIENT
Start: 2024-06-08 | End: 2024-06-08 | Stop reason: SURG

## 2024-06-08 RX ORDER — IBUPROFEN 600 MG/1
600 TABLET ORAL EVERY 6 HOURS
Status: DISCONTINUED | OUTPATIENT
Start: 2024-06-10 | End: 2024-06-11 | Stop reason: HOSPADM

## 2024-06-08 RX ORDER — MORPHINE SULFATE 0.5 MG/ML
INJECTION, SOLUTION EPIDURAL; INTRATHECAL; INTRAVENOUS AS NEEDED
Status: DISCONTINUED | OUTPATIENT
Start: 2024-06-08 | End: 2024-06-08 | Stop reason: SURG

## 2024-06-08 RX ORDER — SODIUM CHLORIDE 9 MG/ML
INJECTION, SOLUTION INTRAVENOUS
Status: DISPENSED
Start: 2024-06-08 | End: 2024-06-09

## 2024-06-08 RX ORDER — KETOROLAC TROMETHAMINE 15 MG/ML
15 INJECTION, SOLUTION INTRAMUSCULAR; INTRAVENOUS EVERY 6 HOURS
Status: DISCONTINUED | OUTPATIENT
Start: 2024-06-08 | End: 2024-06-08

## 2024-06-08 RX ORDER — HYDROMORPHONE HYDROCHLORIDE 2 MG/ML
0.25 INJECTION, SOLUTION INTRAMUSCULAR; INTRAVENOUS; SUBCUTANEOUS
Status: DISCONTINUED | OUTPATIENT
Start: 2024-06-08 | End: 2024-06-08 | Stop reason: HOSPADM

## 2024-06-08 RX ORDER — DIPHENHYDRAMINE HCL 25 MG
25 CAPSULE ORAL EVERY 6 HOURS PRN
Status: ACTIVE | OUTPATIENT
Start: 2024-06-08 | End: 2024-06-09

## 2024-06-08 RX ADMIN — Medication 2 MILLI-UNITS/MIN: at 04:58

## 2024-06-08 RX ADMIN — PROPOFOL 50 MG: 10 INJECTION, EMULSION INTRAVENOUS at 17:07

## 2024-06-08 RX ADMIN — KETOROLAC TROMETHAMINE 30 MG: 30 INJECTION, SOLUTION INTRAMUSCULAR; INTRAVENOUS at 18:06

## 2024-06-08 RX ADMIN — PROPOFOL 50 MG: 10 INJECTION, EMULSION INTRAVENOUS at 16:58

## 2024-06-08 RX ADMIN — PENICILLIN G POTASSIUM 2.5 MILLION UNITS: 5000000 INJECTION, POWDER, FOR SOLUTION INTRAMUSCULAR; INTRAVENOUS at 04:58

## 2024-06-08 RX ADMIN — LIDOCAINE HYDROCHLORIDE AND EPINEPHRINE 2 ML: 15; 5 INJECTION, SOLUTION EPIDURAL at 06:19

## 2024-06-08 RX ADMIN — Medication 50 MCG: at 00:43

## 2024-06-08 RX ADMIN — ONDANSETRON 4 MG: 2 INJECTION INTRAMUSCULAR; INTRAVENOUS at 05:56

## 2024-06-08 RX ADMIN — Medication 999 ML/HR: at 19:13

## 2024-06-08 RX ADMIN — EPHEDRINE SULFATE 5 MG: 50 INJECTION INTRAVENOUS at 08:09

## 2024-06-08 RX ADMIN — MORPHINE SULFATE 2 MG: 0.5 INJECTION, SOLUTION EPIDURAL; INTRATHECAL; INTRAVENOUS at 17:02

## 2024-06-08 RX ADMIN — LIDOCAINE HYDROCHLORIDE AND EPINEPHRINE 4 ML: 15; 5 INJECTION, SOLUTION EPIDURAL at 16:21

## 2024-06-08 RX ADMIN — BUPIVACAINE HYDROCHLORIDE 3 ML: 2.5 INJECTION, SOLUTION EPIDURAL; INFILTRATION; INTRACAUDAL; PERINEURAL at 06:19

## 2024-06-08 RX ADMIN — PROPOFOL 30 MG: 10 INJECTION, EMULSION INTRAVENOUS at 16:43

## 2024-06-08 RX ADMIN — TRAMADOL HYDROCHLORIDE 50 MG: 50 TABLET, FILM COATED ORAL at 19:47

## 2024-06-08 RX ADMIN — DEXMEDETOMIDINE 20 MCG: 100 INJECTION, SOLUTION INTRAVENOUS at 16:46

## 2024-06-08 RX ADMIN — DEXMEDETOMIDINE 20 MCG: 100 INJECTION, SOLUTION INTRAVENOUS at 06:19

## 2024-06-08 RX ADMIN — SODIUM CHLORIDE, POTASSIUM CHLORIDE, SODIUM LACTATE AND CALCIUM CHLORIDE 900 ML/HR: 600; 310; 30; 20 INJECTION, SOLUTION INTRAVENOUS at 08:02

## 2024-06-08 RX ADMIN — PROPOFOL 30 MG: 10 INJECTION, EMULSION INTRAVENOUS at 16:44

## 2024-06-08 RX ADMIN — PENICILLIN G POTASSIUM 2.5 MILLION UNITS: 5000000 INJECTION, POWDER, FOR SOLUTION INTRAMUSCULAR; INTRAVENOUS at 09:11

## 2024-06-08 RX ADMIN — LIDOCAINE HYDROCHLORIDE AND EPINEPHRINE 10 ML: 15; 5 INJECTION, SOLUTION EPIDURAL at 16:01

## 2024-06-08 RX ADMIN — FENTANYL CITRATE 100 MCG: 50 INJECTION, SOLUTION INTRAMUSCULAR; INTRAVENOUS at 06:19

## 2024-06-08 RX ADMIN — EPHEDRINE SULFATE 5 MG: 50 INJECTION INTRAVENOUS at 07:56

## 2024-06-08 RX ADMIN — ACETAMINOPHEN 1000 MG: 500 TABLET ORAL at 16:00

## 2024-06-08 RX ADMIN — PROPOFOL 40 MG: 10 INJECTION, EMULSION INTRAVENOUS at 16:46

## 2024-06-08 RX ADMIN — Medication 10 ML/HR: at 06:19

## 2024-06-08 RX ADMIN — MEPERIDINE HYDROCHLORIDE 12.5 MG: 25 INJECTION INTRAMUSCULAR; INTRAVENOUS; SUBCUTANEOUS at 18:13

## 2024-06-08 RX ADMIN — PENICILLIN G POTASSIUM 2.5 MILLION UNITS: 5000000 INJECTION, POWDER, FOR SOLUTION INTRAMUSCULAR; INTRAVENOUS at 00:43

## 2024-06-08 RX ADMIN — SODIUM CHLORIDE, POTASSIUM CHLORIDE, SODIUM LACTATE AND CALCIUM CHLORIDE: 600; 310; 30; 20 INJECTION, SOLUTION INTRAVENOUS at 15:59

## 2024-06-08 RX ADMIN — Medication 10 ML/HR: at 14:14

## 2024-06-08 RX ADMIN — DEXMEDETOMIDINE 20 MCG: 100 INJECTION, SOLUTION INTRAVENOUS at 17:27

## 2024-06-08 RX ADMIN — SODIUM CHLORIDE 2 G: 9 INJECTION, SOLUTION INTRAVENOUS at 06:05

## 2024-06-08 RX ADMIN — ACETAMINOPHEN 1000 MG: 500 TABLET ORAL at 22:10

## 2024-06-08 RX ADMIN — DEXMEDETOMIDINE 20 MCG: 100 INJECTION, SOLUTION INTRAVENOUS at 16:43

## 2024-06-08 RX ADMIN — LIDOCAINE HYDROCHLORIDE AND EPINEPHRINE 3 ML: 15; 5 INJECTION, SOLUTION EPIDURAL at 06:15

## 2024-06-08 RX ADMIN — PENICILLIN G POTASSIUM 2.5 MILLION UNITS: 5000000 INJECTION, POWDER, FOR SOLUTION INTRAMUSCULAR; INTRAVENOUS at 13:12

## 2024-06-08 RX ADMIN — DEXMEDETOMIDINE 20 MCG: 100 INJECTION, SOLUTION INTRAVENOUS at 16:48

## 2024-06-08 NOTE — PROGRESS NOTES
[facility]  Obstetric Progress Note    Subjective     Patient:    The patient feels well.      Objective     Vital Signs Range for the last 24 hours  Temp:  [98.5 °F (36.9 °C)-99 °F (37.2 °C)] 98.5 °F (36.9 °C)   Temp src: Oral   BP: ()/(48-97) 95/65   Heart Rate:  [54-93] 58   Resp:  [16-18] 18   SpO2:  [95 %-100 %] 99 %           Weight:  [68.5 kg (151 lb)] 68.5 kg (151 lb)         Intake/Output last 24 hours:      Intake/Output Summary (Last 24 hours) at 6/8/2024 0746  Last data filed at 6/8/2024 0404  Gross per 24 hour   Intake --   Output 700 ml   Net -700 ml       Intake/Output this shift:    No intake/output data recorded.    Physical Exam:  General: Patient is well appearing               Amniotomy was performed with return of small amount of clear fluid      Presentation: VTX   Cervix: Exam by: Method: sterile exam per physician (Dr Hermosillo)   Dilation: Cervical Dilation (cm): 4   Effacement: Cervical Effacement: 80%   Station: -1         Fetal Heart Rate Assessment   Method: Fetal HR Assessment Method: external   Beats/min: Fetal HR (beats/min): 110   Baseline: Fetal HR Baseline: normal range   Variability: Fetal HR Variability: moderate (amplitude range 6 to 25 bpm)   Accels: Fetal HR Accelerations: episodic, greater than/equal to 15 bpm, lasting at least 15 seconds   Decels: Fetal HR Decelerations: absent   Tracing Category:       Uterine Assessment   Method: Method: palpation, external tocotransducer   Frequency (min): Contraction Frequency (Minutes): 1-4   Ctx Count in 10 min:     Duration:     Intensity: Contraction Intensity: moderate by palpation   Intensity by IUPC:     Resting Tone: Uterine Resting Tone: soft by palpation   Resting Tone by IUPC:     Rachael Units:         Assessment & Plan       PIH (pregnancy induced hypertension), third trimester        Assessment:  1.  Intrauterine pregnancy at 40w4d gestation with reactive fetal status.    2.  Induction of labor for gestational  "HTN.  3.  Obstetrical history significant for is non-contributory.  4.  GBS status: No results found for: \"STREPGPB\"    Plan:  1. Vaginal anticipated  2. Plan of care has been reviewed with patient and all questions answered  3.  Risks, benefits of treatment plan have been discussed.  4.  All questions have been answered.        Bart Hermosillo MD  6/8/2024  07:46 EDT    "

## 2024-06-08 NOTE — ANESTHESIA PROCEDURE NOTES
Labor Epidural      Patient location during procedure: OB  Start Time: 6/8/2024 5:55 AM  Stop Time: 6/8/2024 6:20 AM  Performed By  CRNA/CAA: Christian Nava CRNA  Preanesthetic Checklist  Completed: patient identified, IV checked, risks and benefits discussed, surgical consent, monitors and equipment checked, pre-op evaluation and timeout performed  Additional Notes  1st attempt L4-5.  It was difficult to get through the processes and supraspinous ligament to find a path forward despite the pt having a relatively good position.  With a midline approach, she stated she felt it hurting bilaterally.  So I stopped and went up one level.  Very similar situation but somewhat more comfortable for her.  After SHELLI, when placing the catheter, she said it hurt transiently.  It was not clear whether she was feeling pressure from the procedure or actual pain.  The catheter was easy to advance.    Prep:  Pt Position:sitting  Sterile Tech:cap, gloves, mask and sterile barrier  Prep:povidone-iodine 7.5% surgical scrub  Monitoring:blood pressure monitoring, continuous pulse oximetry and EKG  Epidural Block Procedure:  Approach:midline  Guidance:landmark technique  Location:L3-L4  Needle Type:Tuohy  Needle Gauge:17 G  Loss of Resistance Medium: saline  Loss of Resistance: 7cm  Cath Depth at skin:12 cm  Paresthesia: transient  Aspiration:negative  Test Dose:negative  Medication: fentaNYL citrate (PF) (SUBLIMAZE) injection - Epidural   100 mcg - 6/8/2024 6:19:00 AM  lidocaine 1.5%-EPINEPHrine 1:200,000 (XYLOCAINE W/EPI) injection - Epidural   3 mL - 6/8/2024 6:15:00 AM   2 mL - 6/8/2024 6:19:00 AM  Number of Attempts: 2  Post Assessment:  Dressing:occlusive dressing applied and secured with tape  Pt Tolerance:patient tolerated the procedure well with no apparent complications  Complications:no

## 2024-06-08 NOTE — ANESTHESIA PREPROCEDURE EVALUATION
Anesthesia Evaluation     no history of anesthetic complications:   NPO Solid Status: > 2 hours  NPO Liquid Status: < 2 hours           Airway   Mallampati: II  TM distance: >3 FB  Neck ROM: full  No difficulty expected  Dental - normal exam     Pulmonary - normal exam   Cardiovascular - normal exam  Exercise tolerance: good (4-7 METS)    (+) hypertension      Neuro/Psych  GI/Hepatic/Renal/Endo      Musculoskeletal         ROS comment: Lumbar degeneration  Abdominal    Substance History   (+) drug use      Comment: THC and also on subutex   OB/GYN    (+) Pregnant, pregnancy induced hypertension        Other                    Anesthesia Plan    ASA 3     epidural       Anesthetic plan, risks, benefits, and alternatives have been provided, discussed and informed consent has been obtained with: patient.    CODE STATUS:    Level Of Support Discussed With: Patient  Code Status (Patient has no pulse and is not breathing): CPR (Attempt to Resuscitate)  Medical Interventions (Patient has pulse or is breathing): Full Support

## 2024-06-08 NOTE — PROGRESS NOTES
Allowed patient to rest for fetal heart tones to recover. Encouraged patient to attempt pushing again. Pt reluctant to push. Pt still declining CS. Pt pushing but poor maternal effort as pt exhausted. FHT's with baseline now in 150's and beat to beat decreasing and FHT's down to 60's -70's with contractions and some have late component. I have reviewed these findings with the patient and I have told her I am concerned that baseline in 150's not necessarily reassuring as I believe this is due to decreased reserve in baby. Head now with caput and molding still +2 station. I again told patient I believe we should do CS. Pt states she is exhausted. I told her I would attempt vacuum only 2 more times but I really believe baby not doing well and we must proceed with CS. I explained CS process with patient and I told her no matter which way we go, vaginal or abdominal there could be complications. We reviewed risks of CS again. Pt now agrees to CS. We will move to CS.

## 2024-06-08 NOTE — NURSING NOTE
Pitocin stopped per patient insistence until epidural is in. Patient up on all 4's rocking and crying.

## 2024-06-08 NOTE — L&D DELIVERY NOTE
SECTION PRIMARY with bilateral salpingectomies Procedure Report    Patient Name:  Lina Bardales  YOB: 1990    Date of Surgery:  2024     Indications:  Arrest of Descent                        Non reassuring fetal status    Pre-op Diagnosis:   FAILURE TO DESCEND  Non reassuring fetal status  Undesired Fertility       Post-Op Diagnosis Codes:   Same    Procedure/CPT® Codes:      Procedure(s):   SECTION PRIMARY  Bilateral Salpingectomy    Staff:  Surgeon(s):  Maty Nascimento DO Kahleifeh, Basim MD         Anesthesia: Epidural    Estimated Blood Loss:  700 mL    Implants:    Nothing was implanted during the procedure    Specimen:          Placenta  Bilateral tubes        Findings: viable female  OP facing maternal right with hyperextension of fetal neck. Normal tubes and ovaries    Complications: None    Description of Procedure: After the appropriate consents had been obtained, the patient was taken  to the operative suite where she was placed  in supine position with left lateral uterine  displacement.The epidural catheter that had been placed for labor was dosed per anesthesia.  Fetal heart tones were obtained and noted to be in a normal  range.  She was prepped and draped in usual sterile fashion.  A  Pfannenstiel incision was made  and sharp and  blunt dissection was carried out to the rectus fascia which was incised in  the midline and developed laterally as well as superiorly and inferiorly.  The rectus muscles were  in the midline and the abdominal cavity  was entered sharply. A self retaining Galen retractor was placed. The bladder peritoneum was developed. The lower uterine  segment  was incised in a transverse  fashion and incision was extended bluntly.  Amniotomy revealed clear  fluid.  A viable female  was delivered at 1642 hrs.  The infant was  bulb suctioned prior to deliver the shoulders.  The cord was clamped x2  and cut.  The infant  was handed off to the nurses were in  attendance for the delivery.  The weight was 7 pounds 0.5 ounces.  Apgars  were 7 and 9.  A specimen for the venous cord pH as well as the cord  blood was obtained.the arterial cord pH was not able to be obtained.  The placenta was delivered spontaneously and intact.  The uterus was delivered onto the abdomen.  The uterine incision was  reapproximated using a suture of 0 Vicryl and then imbricated in the  second layer using a suture of 0 Vicryl.  There was still some bleeding  that was contained with some figure-of-eight's.  The patient was desirous  of removal of the tubes if possible.  The right tube was grasped followed  to the fimbriated end and using the LigaSure device and remaining very  close to the antimesenteric portion of the tube, the tube was desiccated  cut and handed off the field.  A similar procedure was carried out on the  opposite side.  Excellent hemostasis was noted.  The uterine incision was  reinspected and hemostasis was assured.  The cul-de-sac was  irrigated with  warm saline all remaining blood clots removed.  The uterus was returned to  the abdomen.  The pelvic gutters were irrigated with warm saline.  All  remaining blood clots were removed.  The uterine incision was reinspected  and hemostasis was assured.  The Mcknight catheter was noted to be draining  clear urine.  The rectus muscles were loosely reapproximated in an  interrupted fashion using suture of 0 Vicryl.  The rectus fascia is  reapproximated using suture of 0 Vicryl.  The subcutaneous tissues were  irrigated with warm saline and the subcutaneous bleeders were cauterized  with electrocautery.  Ashley's fascia was loosely reapproximated in a  running fashion using suture 3-0 chromic.  The skin was reapproximated in  a subcuticular fashion using suture of 4-0 Kwill.  A pressure dressing was applied.  The vagina was evacuated of  all remaining blood clots.  The patient was noted to be draining  clear  urine.  The sponge counts and instrument counts were verified as correct.  The patient was awakened and transferred to the recovery room in stable  and satisfactory condition.       Dr Bart Hermosillo MD was responsible for performing the following activities: Retraction, Suction, Irrigation, Suturing, and Delivery of Fetus and their skilled assistance was necessary for the success of this case.    Maty Nascimento,      Date: 6/8/2024  Time: 17:56 EDT

## 2024-06-08 NOTE — PROGRESS NOTES
Late entry:  Pt has tubal papers signed and does not desire future fertility. She desires permanent sterilization.Pt was admitted for induction for anticipated vaginal delivery and it has not been 30 days since tubal papers signed. Pt taken to OR for arrest of descent and non reassuring fetal testing. I did perform bilateral salpingectomies as patient needed unplanned surgery

## 2024-06-08 NOTE — NURSING NOTE
34 YO  with late and limited PNC @ 40 3/7 weeks gestation present for admission for PIH. Denies h/a (reports one earlier and took allergy pill and it left quickly, denies blurred vision or epigastric pain. Reports good fetal movement. Hx of opoid substance abuse and taking subutex (reports clean for 6 years and been with suboxone/subutex treatment since then). Denies LOF or vaginal bleeding.

## 2024-06-08 NOTE — H&P
JUANCARLOS Antunez  Obstetric History and Physical    Chief Complaint   Patient presents with    Elevated Blood Pressure       Subjective     HPI:    Patient is a 33 y.o. female  currently at 40w3d, who presents to OB ED with/for elevated BP in office. She has no symptoms of elevated BP. She also had an elevated BP about 3 wks ago with primary care. Meets criteria for PIH. Patient has late prenatal care and dating based on 36 wks ultrasound.    She has been seeing [facility] for her prenatal care.  The pregnancy has been complicated by the following problems:    Patient Active Problem List   Diagnosis    Suboxone maintenance treatment complicating pregnancy, antepartum    Rh negative, antepartum    Supervision of other normal pregnancy, antepartum    Antepartum multigravida of advanced maternal age    Late prenatal care    Unwanted fertility    PIH (pregnancy induced hypertension), third trimester       The following portions of the patients history were reviewed and updated as appropriate:   current medications, allergies, past medical history, past surgical history, past family history, past social history and current problem list.     Prenatal Information:     2nd and 3rd Trimester       Test Value Reference Range Date Time    Hemoglobin (repeated)  13.4 g/dL 12.0 - 15.9 24       11.8 g/dL 12.0 - 15.9 24 1307       11.5 g/dL 12.0 - 15.9 24 0913    Hematocrit (repeated)  36.9 % 34.0 - 46.6 24       34.9 % 34.0 - 46.6 24 1307       35.5 % 34.0 - 46.6 24 0913    Platelets   241 10*3/mm3 140 - 450 24       211 10*3/mm3 140 - 450 24 1307       236 10*3/mm3 140 - 450 24 0913    1 hour GTT   103 mg/dL 65 - 139 24 1307    Antibody Screen (repeated)  Negative   24 1307       Negative   24 0913                Drug Screening       Test Value Reference Range Date Time    Amphetamine Screen        Barbiturate Screen  Negative  Negative 24  1912       Negative  Negative 24 0902    Benzodiazepine Screen  Negative  Negative 24 191       Negative  Negative 24 0902    Methadone Screen  Negative  Negative 24 191       Negative  Negative 24 0902    Phencyclidine Screen        Opiates Screen  Negative  Negative 24 191       Negative  Negative 24 0902    THC Screen  Positive  Negative 24       Positive  Negative 24 0902    Cocaine Screen  Negative  Negative 24       Negative  Negative 24 0902    Oxycodone Screen  Negative  Negative 24 191       Negative  Negative 24 0902               External Prenatal Results       Pregnancy Outside Results - Transcribed From Office Records - See Scanned Records For Details       Test Value Date Time    ABO  AB  24 1307    Rh  Negative  24 1307    Antibody Screen  Negative  24 1307       Negative  24 0913    Varicella IgG       Rubella  <0.90 index 24 0913    Hgb  13.4 g/dL 24       11.8 g/dL 24 1307       11.5 g/dL 24 0913    Hct  36.9 % 24       34.9 % 24 1307       35.5 % 24 0913    HgB A1c        1h GTT  103 mg/dL 24 1307    Gonorrhea (discrete)  Negative  24 0902    Chlamydia (discrete)  Negative  24 0902    HBsAg  Non-Reactive  24 0913    HIV  Non-Reactive  24 0913    Hep C RNA Quant PCR       Hep C Antibody  Non-Reactive  24 0913              Past OB History:     OB History    Para Term  AB Living   3 2 2 0 0 2   SAB IAB Ectopic Molar Multiple Live Births   0 0 0 0 0 2      # Outcome Date GA Lbr Amador/2nd Weight Sex Type Anes PTL Lv   3 Current            2 Term 10/08/22 41w4d 16:42 / 02:05 3550 g (7 lb 13.2 oz) M Vag-Spont EPI N CLARKE      Name: TONEY CLINTON      Apgar1: 8  Apgar5: 9   1 Term 14 40w0d  3402 g (7 lb 8 oz) F Vag-Spont  N CLARKE       Past Medical History: Past Medical History:    Diagnosis Date    Arthritis     Substance abuse     Last taken 6 years ago.      Past Surgical History Past Surgical History:   Procedure Laterality Date    WISDOM TOOTH EXTRACTION        Family History: Family History   Problem Relation Age of Onset    Hypertension Father     Colon cancer Other     Breast cancer Other       Social History:  reports that she has never smoked. She has never used smokeless tobacco.   reports no history of alcohol use.   reports current drug use. Drugs: Marijuana and Hydrocodone.        General ROS: Pertinent items are noted in HPI  Home Medications:  Prenatal Vitamin, buprenorphine, clotrimazole, and loratadine    Allergies:  No Known Allergies    Objective       Vital Signs Range for the last 24 hours  Temperature: Temp:  [99 °F (37.2 °C)] 99 °F (37.2 °C)   Temp Source: Temp src: Oral   BP: BP: ()/(80-91) 99/80   Pulse: Heart Rate:  [79-84] 80   Respirations: Resp:  [18] 18   SPO2: SpO2:  [97 %-99 %] 97 %     Physical Examination:   General appearance - alert, well appearing, and in no distress  Mental status - alert, oriented to person, place, and time  Chest - normal effort; CTA  Heart - normal rate, regular rhythm  Abdomen - soft, nondistended; no rebound or guarding  Pelvic - 1/long/posterior/thick  Back exam - no CVA tenderness  Neurological - alert, oriented, normal speech  Extremities - Edema none  Skin - normal coloration and turgor, no suspicious skin lesions noted   [place]: occasional contractions   NST: reactive     Lab Results (last 24 hours)       Procedure Component Value Units Date/Time    POC Urinalysis Dipstick [[id number]] Collected: 06/07/24 0908    Specimen: Urine Updated: 06/07/24 0908     Glucose, UA Negative mg/dL      Protein, POC Negative mg/dL     Group B Strep (Molecular) - Swab, Vaginal/Rectum [[id number]] Collected: 06/07/24 1040    Specimen: Swab from Vaginal/Rectum Updated: 06/07/24 1040    CBC (No Diff) [[id number]]  (Abnormal) Collected: 06/07/24  1911    Specimen: Blood Updated: 06/07/24 1921     WBC 11.72 10*3/mm3      RBC 4.49 10*6/mm3      Hemoglobin 13.4 g/dL      Hematocrit 36.9 %      MCV 82.2 fL      MCH 29.8 pg      MCHC 36.3 g/dL      RDW 12.6 %      RDW-SD 37.7 fl      MPV 10.5 fL      Platelets 241 10*3/mm3     Comprehensive Metabolic Panel [[id number]]  (Abnormal) Collected: 06/07/24 1911    Specimen: Blood Updated: 06/07/24 1945     Glucose 81 mg/dL      BUN 7 mg/dL      Creatinine 0.58 mg/dL      Sodium 133 mmol/L      Potassium 3.8 mmol/L      Chloride 100 mmol/L      CO2 20.5 mmol/L      Calcium 8.9 mg/dL      Total Protein 6.8 g/dL      Albumin 3.8 g/dL      ALT (SGPT) 16 U/L      AST (SGOT) 22 U/L      Alkaline Phosphatase 200 U/L      Total Bilirubin 0.2 mg/dL      Globulin 3.0 gm/dL      A/G Ratio 1.3 g/dL      BUN/Creatinine Ratio 12.1     Anion Gap 12.5 mmol/L      eGFR 121.2 mL/min/1.73     Narrative:      GFR Normal >60  Chronic Kidney Disease <60  Kidney Failure <15      T Pallidum Antibody w/ reflex RPR (Syphilis) [[id number]]  (Normal) Collected: 06/07/24 1911    Specimen: Blood Updated: 06/07/24 1947     Treponemal AB Total Non-Reactive    Protein / Creatinine Ratio, Urine - Urine, Clean Catch [[id number]] Collected: 06/07/24 1912    Specimen: Urine, Clean Catch Updated: 06/07/24 1948     Creatinine, Urine 107.6 mg/dL      Total Protein, Urine 8.8 mg/dL      Protein/Creatinine Ratio, Urine 0.08    Urine Drug Screen - Urine, Clean Catch [[id number]]  (Abnormal) Collected: 06/07/24 1912    Specimen: Urine, Clean Catch Updated: 06/07/24 1952     Amphet/Methamphet, Screen Negative     Barbiturates Screen, Urine Negative     Benzodiazepine Screen, Urine Negative     Cocaine Screen, Urine Negative     Opiate Screen Negative     THC, Screen, Urine Positive     Methadone Screen, Urine Negative     Oxycodone Screen, Urine Negative     Fentanyl, Urine Negative    Narrative:      Negative Thresholds Per Drugs Screened:    Amphetamines                  500 ng/ml  Barbiturates                 200 ng/ml  Benzodiazepines              100 ng/ml  Cocaine                      300 ng/ml  Methadone                    300 ng/ml  Opiates                      300 ng/ml  Oxycodone                    100 ng/ml  THC                           50 ng/ml  Fentanyl                       5 ng/ml      The Normal Value for all drugs tested is negative. This report includes final unconfirmed screening results to be used for medical treatment purposes only. Unconfirmed results must not be used for non-medical purposes such as employment or legal testing. Clinical consideration should be applied to any drug of abuse test, particularly when unconfirmed results are used.                    Assessment & Plan     Assessment:  -  Intrauterine pregnancy at 40w3d gestation who presents for: elevated BP in office. Elevated BP twice on separate occasions diagnostic for PIH. Admit for induction.    Plan:  - Admit. Cytotec induction. GBS done today. No risk factors but due to limited care and late dating go ahead with GBS prophylaxis.   - Plan of care has been reviewed with patient and patient agrees.   - Risks, benefits of treatment plan have been discussed.  - All questions have been answered.        Electronically signed by Mervat Delatorre MD, 06/07/24, 8:08 PM EDT.

## 2024-06-09 LAB
ABO GROUP BLD: NORMAL
ANTI-D, PASSIVE: NORMAL
BASOPHILS # BLD AUTO: 0.06 10*3/MM3 (ref 0–0.2)
BASOPHILS NFR BLD AUTO: 0.4 % (ref 0–1.5)
BLD GP AB SCN SERPL QL: POSITIVE
DEPRECATED RDW RBC AUTO: 39.3 FL (ref 37–54)
EOSINOPHIL # BLD AUTO: 0.31 10*3/MM3 (ref 0–0.4)
EOSINOPHIL NFR BLD AUTO: 2.2 % (ref 0.3–6.2)
ERYTHROCYTE [DISTWIDTH] IN BLOOD BY AUTOMATED COUNT: 12.7 % (ref 12.3–15.4)
FETAL BLEED: NEGATIVE
HCT VFR BLD AUTO: 28.5 % (ref 34–46.6)
HGB BLD-MCNC: 9.8 G/DL (ref 12–15.9)
IMM GRANULOCYTES # BLD AUTO: 0.05 10*3/MM3 (ref 0–0.05)
IMM GRANULOCYTES NFR BLD AUTO: 0.3 % (ref 0–0.5)
LYMPHOCYTES # BLD AUTO: 1.87 10*3/MM3 (ref 0.7–3.1)
LYMPHOCYTES NFR BLD AUTO: 13 % (ref 19.6–45.3)
MCH RBC QN AUTO: 29.5 PG (ref 26.6–33)
MCHC RBC AUTO-ENTMCNC: 34.4 G/DL (ref 31.5–35.7)
MCV RBC AUTO: 85.8 FL (ref 79–97)
MONOCYTES # BLD AUTO: 0.57 10*3/MM3 (ref 0.1–0.9)
MONOCYTES NFR BLD AUTO: 4 % (ref 5–12)
NEUTROPHILS NFR BLD AUTO: 11.51 10*3/MM3 (ref 1.7–7)
NEUTROPHILS NFR BLD AUTO: 80.1 % (ref 42.7–76)
NRBC BLD AUTO-RTO: 0 /100 WBC (ref 0–0.2)
NUMBER OF DOSES: ABNORMAL
PLATELET # BLD AUTO: 180 10*3/MM3 (ref 140–450)
PMV BLD AUTO: 10.7 FL (ref 6–12)
RBC # BLD AUTO: 3.32 10*6/MM3 (ref 3.77–5.28)
RH BLD: NEGATIVE
WBC NRBC COR # BLD AUTO: 14.37 10*3/MM3 (ref 3.4–10.8)

## 2024-06-09 PROCEDURE — 86900 BLOOD TYPING SEROLOGIC ABO: CPT | Performed by: OBSTETRICS & GYNECOLOGY

## 2024-06-09 PROCEDURE — 25010000002 RHO D IMMUNE GLOBULIN 1500 UNIT/2ML SOLUTION PREFILLED SYRINGE: Performed by: OBSTETRICS & GYNECOLOGY

## 2024-06-09 PROCEDURE — 86901 BLOOD TYPING SEROLOGIC RH(D): CPT | Performed by: OBSTETRICS & GYNECOLOGY

## 2024-06-09 PROCEDURE — 3E0234Z INTRODUCTION OF SERUM, TOXOID AND VACCINE INTO MUSCLE, PERCUTANEOUS APPROACH: ICD-10-PCS | Performed by: OBSTETRICS & GYNECOLOGY

## 2024-06-09 PROCEDURE — 85461 HEMOGLOBIN FETAL: CPT | Performed by: OBSTETRICS & GYNECOLOGY

## 2024-06-09 PROCEDURE — 0503F POSTPARTUM CARE VISIT: CPT | Performed by: OBSTETRICS & GYNECOLOGY

## 2024-06-09 PROCEDURE — 25010000002 KETOROLAC TROMETHAMINE PER 15 MG: Performed by: OBSTETRICS & GYNECOLOGY

## 2024-06-09 PROCEDURE — 63710000001 ONDANSETRON ODT 4 MG TABLET DISPERSIBLE: Performed by: OBSTETRICS & GYNECOLOGY

## 2024-06-09 PROCEDURE — 85025 COMPLETE CBC W/AUTO DIFF WBC: CPT | Performed by: OBSTETRICS & GYNECOLOGY

## 2024-06-09 PROCEDURE — 86870 RBC ANTIBODY IDENTIFICATION: CPT | Performed by: OBSTETRICS & GYNECOLOGY

## 2024-06-09 RX ADMIN — MAGNESIUM HYDROXIDE 5 ML: 2400 SUSPENSION ORAL at 09:05

## 2024-06-09 RX ADMIN — BUPRENORPHINE 8 MG: 8 TABLET SUBLINGUAL at 10:29

## 2024-06-09 RX ADMIN — ACETAMINOPHEN 1000 MG: 500 TABLET ORAL at 02:38

## 2024-06-09 RX ADMIN — MISOPROSTOL 200 MCG: 200 TABLET ORAL at 12:59

## 2024-06-09 RX ADMIN — HUMAN RHO(D) IMMUNE GLOBULIN 1500 UNITS: 1500 SOLUTION INTRAMUSCULAR; INTRAVENOUS at 14:54

## 2024-06-09 RX ADMIN — KETOROLAC TROMETHAMINE 15 MG: 15 INJECTION, SOLUTION INTRAMUSCULAR; INTRAVENOUS at 12:58

## 2024-06-09 RX ADMIN — TRAMADOL HYDROCHLORIDE 50 MG: 50 TABLET, FILM COATED ORAL at 18:47

## 2024-06-09 RX ADMIN — TRAMADOL HYDROCHLORIDE 50 MG: 50 TABLET, FILM COATED ORAL at 09:15

## 2024-06-09 RX ADMIN — TRAMADOL HYDROCHLORIDE 50 MG: 50 TABLET, FILM COATED ORAL at 02:38

## 2024-06-09 RX ADMIN — KETOROLAC TROMETHAMINE 15 MG: 15 INJECTION, SOLUTION INTRAMUSCULAR; INTRAVENOUS at 17:48

## 2024-06-09 RX ADMIN — MISOPROSTOL 200 MCG: 200 TABLET ORAL at 05:37

## 2024-06-09 RX ADMIN — MAGNESIUM HYDROXIDE 5 ML: 2400 SUSPENSION ORAL at 23:17

## 2024-06-09 RX ADMIN — IBUPROFEN 600 MG: 600 TABLET, FILM COATED ORAL at 23:14

## 2024-06-09 RX ADMIN — KETOROLAC TROMETHAMINE 15 MG: 15 INJECTION, SOLUTION INTRAMUSCULAR; INTRAVENOUS at 05:37

## 2024-06-09 RX ADMIN — ACETAMINOPHEN 650 MG: 325 TABLET ORAL at 20:50

## 2024-06-09 RX ADMIN — ACETAMINOPHEN 1000 MG: 500 TABLET ORAL at 09:04

## 2024-06-09 RX ADMIN — MISOPROSTOL 200 MCG: 200 TABLET ORAL at 00:35

## 2024-06-09 RX ADMIN — KETOROLAC TROMETHAMINE 15 MG: 15 INJECTION, SOLUTION INTRAMUSCULAR; INTRAVENOUS at 00:35

## 2024-06-09 RX ADMIN — ONDANSETRON 4 MG: 4 TABLET, ORALLY DISINTEGRATING ORAL at 06:10

## 2024-06-09 RX ADMIN — MAGNESIUM HYDROXIDE 5 ML: 2400 SUSPENSION ORAL at 00:35

## 2024-06-09 NOTE — PROGRESS NOTES
JUANCARLOS Antunez   PROGRESS NOTE    Post-Op Day 1 S/P     Subjective:  Patient has no complaints  Pain controlled  Tolerating a regular diet  Passing flatus  Ambulating  Urinating spontaneously  Lochia decreasing, no bleeding concerns    Objective    Objective:  Vital signs (most recent): Blood pressure 116/80, pulse 89, temperature 98.1 °F (36.7 °C), temperature source Oral, resp. rate 16, SpO2 98%, not currently breastfeeding.       Temp:  [97.1 °F (36.2 °C)-98.5 °F (36.9 °C)] 98.1 °F (36.7 °C)  Heart Rate:  [51-91] 89  Resp:  [12-20] 16  BP: ()/(44-85) 116/80     Physical Exam:  GEN: alert and in no distress  Abdomen: fundus firm - below the umbilicus  abdomen soft + BS's  Abdominal binder in place  Incision: dressed  Extremities:Derik's sign negative bilaterally, no redness or tenderness in the calves or thighs     Labs:  Lab Results (last 24 hours)       Procedure Component Value Units Date/Time    Blood Gas, Venous, Cord [[id number]]  (Abnormal) Collected: 24    Specimen: Cord Blood Venous from Umbilical Cord Updated: 24     pH, Cord Venous 7.221 pH Units      pCO2, Cord Venous 52.7 mm Hg      pO2, Cord Venous 25.0 mm Hg      HCO3, Cord Venous 21.6 mmol/L      Base Excess, Cord Venous -7.0 mmol/L      Comment: Serial Number: [id number]:  [id number]        O2 Sat, Cord Venous 33.8 %      Barometric Pressure for Blood Gas 741.7000 mmHg      Notified Who sneha     Read Back Yes    CBC & Differential [[id number]]  (Abnormal) Collected: 24 0511    Specimen: Blood from Arm, Right Updated: 24 0617    Narrative:      The following orders were created for panel order CBC & Differential.  Procedure                               Abnormality         Status                     ---------                               -----------         ------                     CBC Auto Differential[[id number]]        Abnormal            Final result                 Please view results for  these tests on the individual orders.    CBC Auto Differential [[id number]]  (Abnormal) Collected: 24    Specimen: Blood from Arm, Right Updated: 24     WBC 14.37 10*3/mm3      RBC 3.32 10*6/mm3      Hemoglobin 9.8 g/dL      Hematocrit 28.5 %      MCV 85.8 fL      MCH 29.5 pg      MCHC 34.4 g/dL      RDW 12.7 %      RDW-SD 39.3 fl      MPV 10.7 fL      Platelets 180 10*3/mm3      Neutrophil % 80.1 %      Lymphocyte % 13.0 %      Monocyte % 4.0 %      Eosinophil % 2.2 %      Basophil % 0.4 %      Immature Grans % 0.3 %      Neutrophils, Absolute 11.51 10*3/mm3      Lymphocytes, Absolute 1.87 10*3/mm3      Monocytes, Absolute 0.57 10*3/mm3      Eosinophils, Absolute 0.31 10*3/mm3      Basophils, Absolute 0.06 10*3/mm3      Immature Grans, Absolute 0.05 10*3/mm3      nRBC 0.0 /100 WBC                Assessment & Plan        PIH (pregnancy induced hypertension), third trimester    Arrest of descent, delivered, current hospitalization    Occiput posterior presentation of fetus    Non-reassuring fetal status, delivered, current hospitalization    Term birth of  female    Post-term pregnancy, 40-42 weeks of gestation    Assessment & Plan    Assessment:    Lina Longtring is Day 1  post-partum  , Low Transverse   .      Plan:    Routine postpartum/postop care    Remove bandage, Shower, PO pain meds, Breast feeding support        Maty Nascimento DO  24  12:06 EDT

## 2024-06-09 NOTE — PLAN OF CARE
Problem: Adult Inpatient Plan of Care  Goal: Plan of Care Review  Outcome: Ongoing, Progressing  Goal: Patient-Specific Goal (Individualized)  Outcome: Ongoing, Progressing  Goal: Absence of Hospital-Acquired Illness or Injury  Outcome: Ongoing, Progressing  Intervention: Identify and Manage Fall Risk  Description: Perform standard risk assessment on admission using a validated tool or comprehensive approach appropriate to the patient; reassess fall risk frequently, with change in status or transfer to another level of care.  Communicate fall injury risk to interprofessional healthcare team.  Determine need for increased observation, equipment and environmental modification, such as low bed, signage and supportive, nonskid footwear.  Adjust safety measures to individual developmental age, stage and identified risk factors.  Reinforce the importance of safety and physical activity with patient and family.  Perform regular intentional rounding to assess need for position change, pain assessment and personal needs, including assistance with toileting.  Recent Flowsheet Documentation  Taken 6/9/2024 0915 by Joana Winn, RN  Safety Promotion/Fall Prevention: safety round/check completed  Intervention: Prevent and Manage VTE (Venous Thromboembolism) Risk  Description: Assess for VTE (venous thromboembolism) risk.  Encourage and assist with early ambulation.  Initiate and maintain compression or other therapy, as indicated, based on identified risk in accordance with organizational protocol and provider order.  Encourage both active and passive leg exercises while in bed, if unable to ambulate.  Recent Flowsheet Documentation  Taken 6/9/2024 0915 by Joana Winn, RN  Activity Management: up ad tunde  Goal: Optimal Comfort and Wellbeing  Outcome: Ongoing, Progressing  Intervention: Monitor Pain and Promote Comfort  Description: Assess pain level, treatment efficacy and patient response at regular intervals using a  consistent pain scale.  Consider the presence and impact of preexisting chronic pain.  Encourage patient and caregiver involvement in pain assessment, interventions and safety measures.  Recent Flowsheet Documentation  Taken 2024 by Joana Winn RN  Pain Management Interventions: see MAR  Intervention: Provide Person-Centered Care  Description: Use a family-focused approach to care.  Develop trust and rapport by proactively providing information, encouraging questions, addressing concerns and offering reassurance.  Acknowledge emotional response to hospitalization.  Recognize and utilize personal coping strategies.  Honor spiritual and cultural preferences.  Recent Flowsheet Documentation  Taken 2024 by Joana Winn, RN  Trust Relationship/Rapport:   care explained   questions answered   questions encouraged   reassurance provided  Goal: Readiness for Transition of Care  Outcome: Ongoing, Progressing     Problem: Hypertension Comorbidity  Goal: Blood Pressure in Desired Range  Outcome: Ongoing, Progressing     Problem: Adjustment to Role Transition (Postpartum  Delivery)  Goal: Successful Maternal Role Transition  Outcome: Ongoing, Progressing     Problem: Bleeding (Postpartum  Delivery)  Goal: Hemostasis  Outcome: Ongoing, Progressing     Problem: Infection (Postpartum  Delivery)  Goal: Absence of Infection Signs and Symptoms  Outcome: Ongoing, Progressing     Problem: Pain (Postpartum  Delivery)  Goal: Acceptable Pain Control  Outcome: Ongoing, Progressing  Intervention: Prevent or Manage Pain  Description: Determine pain management plan with patient and caregiver/family; review plan regularly.  Monitor for the presence of incisional pain; provide timely pain management interventions when present.  Use cold application, as culturally-appropriate, to the incisional area for the first 24 to 48 hours to enhance comfort.  Encourage early ambulation, when able, to  help avoid gas accumulation which can add to discomfort.  Verify correct infant latch when breastfeeding to prevent nipple pain.  Consider the presence and impact of preexisting chronic pain.  Encourage patient and caregiver involvement in pain assessment, interventions and safety measures.  Evaluate risk for opioid use; individualize pharmacologic pain management plan and titrate medication to patient response.  Combine multimodal analgesia and nonpharmacologic strategies to help potentiate synergistic effects and enhance comfort (e.g., complementary therapy, diversional activity).  Provide around-the-clock dosing of pain medication to keep pain levels in control.  Manage medication-induced effects, such as constipation, nausea, vomiting.  Support and optimize psychosocial response to pain.  If engorgement occurs, encourage more frequent breastfeeding or pumping and storing additional milk to ease discomfort. Note: Cold compresses, as culturally-appropriate, may be used if bottle-feeding.  If post-dural puncture headache identified, encourage adequate hydration and anticipate the need for epidural blood patch.  If hemorrhoids are present and painful, offer topical pain relief and sitz baths for comfort.  Recent Flowsheet Documentation  Taken 2024 0915 by Joana Winn RN  Pain Management Interventions: see MAR     Problem: Postoperative Nausea and Vomiting (Postpartum  Delivery)  Goal: Nausea and Vomiting Relief  Outcome: Ongoing, Progressing     Problem: Postoperative Urinary Retention (Postpartum  Delivery)  Goal: Effective Urinary Elimination  Outcome: Ongoing, Progressing   Goal Outcome Evaluation:

## 2024-06-09 NOTE — PLAN OF CARE
Goal Outcome Evaluation:           Progress: improving  Outcome Evaluation: VSS (no elevated blood pressures this shift) FF @ umbilicus with light rubra. Mcknight removed @ 0045, no void this far. Tolerating PO fluids and solids with no nausea or vomiting. Pain controlled with ATC medications and prn Tramadol. Patient OOB and ambulated x 2 thus far since surgery. Lower transverse dressing CDI and abdominal binder in use. Patient utilized SCDs until later in shift and is currently refusing despite encouragement and rationale for intervention; will cotinue to encourage.                               Problem: Delayed Labor Progression (Labor)  Goal: Effective Progression to Delivery  Outcome: Unable to Meet, Plan Revised

## 2024-06-10 PROCEDURE — 0503F POSTPARTUM CARE VISIT: CPT | Performed by: OBSTETRICS & GYNECOLOGY

## 2024-06-10 PROCEDURE — 63710000001 ONDANSETRON ODT 4 MG TABLET DISPERSIBLE: Performed by: OBSTETRICS & GYNECOLOGY

## 2024-06-10 RX ADMIN — BUPRENORPHINE 8 MG: 8 TABLET SUBLINGUAL at 09:06

## 2024-06-10 RX ADMIN — ONDANSETRON 4 MG: 4 TABLET, ORALLY DISINTEGRATING ORAL at 17:10

## 2024-06-10 RX ADMIN — ACETAMINOPHEN 650 MG: 325 TABLET ORAL at 22:12

## 2024-06-10 RX ADMIN — ACETAMINOPHEN 650 MG: 325 TABLET ORAL at 03:13

## 2024-06-10 RX ADMIN — ACETAMINOPHEN 650 MG: 325 TABLET ORAL at 09:06

## 2024-06-10 RX ADMIN — IBUPROFEN 600 MG: 600 TABLET, FILM COATED ORAL at 05:54

## 2024-06-10 RX ADMIN — IBUPROFEN 600 MG: 600 TABLET, FILM COATED ORAL at 13:11

## 2024-06-10 RX ADMIN — MAGNESIUM HYDROXIDE 5 ML: 2400 SUSPENSION ORAL at 16:14

## 2024-06-10 RX ADMIN — ACETAMINOPHEN 650 MG: 325 TABLET ORAL at 16:13

## 2024-06-10 RX ADMIN — ALUMINUM HYDROXIDE, MAGNESIUM HYDROXIDE, AND DIMETHICONE 15 ML: 400; 400; 40 SUSPENSION ORAL at 20:27

## 2024-06-10 RX ADMIN — MAGNESIUM HYDROXIDE 5 ML: 2400 SUSPENSION ORAL at 09:06

## 2024-06-10 RX ADMIN — ONDANSETRON 4 MG: 4 TABLET, ORALLY DISINTEGRATING ORAL at 06:33

## 2024-06-10 RX ADMIN — IBUPROFEN 600 MG: 600 TABLET, FILM COATED ORAL at 17:10

## 2024-06-10 NOTE — ANESTHESIA POSTPROCEDURE EVALUATION
Patient: Lina Bardales    Procedure Summary       Date: 24 Room / Location: [facility] LABOR DELIVERY  [facility] LABOR DELIVERY    Anesthesia Start: 555 Anesthesia Stop:     Procedure:  SECTION PRIMARY (Bilateral: Abdomen) Diagnosis: (FAILURE TO DESCEND)    Surgeons: Maty Nascimento DO Provider: Edwar Mishra CRNA    Anesthesia Type: epidural ASA Status: 3            Anesthesia Type: No value filed.    Vitals  Vitals Value Taken Time   /84 240   Temp 36.9 °C (98.4 °F) 24   Pulse 83 240   Resp 16 24   SpO2 98 % 24           Post Anesthesia Care and Evaluation    Patient location during evaluation: bedside  Patient participation: complete - patient participated  Level of consciousness: awake  Pain management: adequate    Airway patency: patent  Anesthetic complications: No anesthetic complications  PONV Status: controlled  Cardiovascular status: acceptable and stable  Respiratory status: acceptable  Hydration status: acceptable  Post Neuraxial Block status: Motor and sensory function returned to baseline and No signs or symptoms of PDPH

## 2024-06-10 NOTE — DISCHARGE SUMMARY
"             [facility]         DISCHARGE SUMMARY    Patient Name: Lina Bardales  : 1990  MRN: [id number]    Date of Admission: 2024  Date of Discharge:  2024   Primary Care Physician: System, Provider Not In    Consults       No orders found from 2024 to 2024.             Procedures:  Primary CS with bilateral salpingectomy    Presenting Problem:   PIH (pregnancy induced hypertension), third trimester [O13.3]    Admitting Diagnosis:  IUP @ 40.4 weeks  Late and Infrequent Prenatal Care  Post dates Pregnancy  Arrest Descent  Undesired Fertility  PIH  Opioid Use Disorder  Rh negative  Discharge Diagnosis:  Same  Post Operative/Post partum Anemia    Delivery Summary     OB Surgeon:  Maty Nascimento DO  Anesthesia: Epidural  Delivery Type:  LTCS, Bilateral salpingectomy  Perineum: OBPERINEUM: Intact  Feeding method: Bottle    Infant: female  infant;    Weight: 3190 g (7 lb 0.5 oz)     APGARS: 7  @ 1 minute / 9  @ 5 minutes   Venous Blood Gas:   pH, Cord Venous   Date Value Ref Range Status   2024 7.221 (L) 7.260 - 7.400 pH Units Final     Base Excess, Cord Venous   Date Value Ref Range Status   2024 -7.0 -30.0 - 30.0 mmol/L Final     Comment:     Serial Number: [id number]:  [id number]      Arterial Blood Gas: No results found for: \"PHCART\", \"BECART\"     Hospital Course     Hospital Course:  Lina Bardales is a 33 y.o.  40w4d who presented on 2024 for induction of labor due to PIH at term. Pt received cytotec then pitocin. Pt progressed to complete/complete and pushed with no further descent from +2 station. Baby was in the OP position. Pt initially refused CS and vacuum applied x2 with no movement of fetal head. Pt continued to push with deep variables and finally fetal tachycardia with decreasing variability. Pt advised multiple times to have cs and finally she agreed. CS and bilateral salpingectomy was without incident. Her post partum/ post operative " course was benign. She had a normal return to bowel function and was tolerating a regular diet after delivery. She was bottle feeding and ready for discharge on PPD#2.She is Rh negative and did receive rhogam prior to discharge.    Day of Discharge     Vital Signs:  Temp:  [98 °F (36.7 °C)-98.2 °F (36.8 °C)] 98 °F (36.7 °C)  Heart Rate:  [57-95] 76  Resp:  [16-17] 17  BP: (102-127)/(65-87) 127/83    Pertinent  and/or Most Recent Results     LAB RESULTS:       Lab 06/09/24  0511 06/07/24 1911   WBC 14.37* 11.72*   HEMOGLOBIN 9.8* 13.4   HEMATOCRIT 28.5* 36.9   PLATELETS 180 241   NEUTROS ABS 11.51*  --    IMMATURE GRANS (ABS) 0.05  --    LYMPHS ABS 1.87  --    MONOS ABS 0.57  --    EOS ABS 0.31  --    MCV 85.8 82.2         Lab 06/07/24 1911   SODIUM 133*   POTASSIUM 3.8   CHLORIDE 100   CO2 20.5*   ANION GAP 12.5   BUN 7   CREATININE 0.58   EGFR 121.2   GLUCOSE 81   CALCIUM 8.9         Lab 06/07/24 1911   TOTAL PROTEIN 6.8   ALBUMIN 3.8   GLOBULIN 3.0   ALT (SGPT) 16   AST (SGOT) 22   BILIRUBIN 0.2   ALK PHOS 200*             Lab 06/09/24  0511 06/07/24 1911   ABO TYPING AB AB   RH TYPING Negative Negative   ANTIBODY SCREEN Positive Positive     URINALYSIS@  Microbiology Results (last 10 days)       Procedure Component Value - Date/Time    Group B Strep (Molecular) - Swab, Vaginal/Rectum [[id number]]  (Normal) Collected: 06/07/24 1040    Lab Status: Final result Specimen: Swab from Vaginal/Rectum Updated: 06/08/24 1643     Group B Strep, DNA Negative               Discharge Details        Discharge Medications        New Medications        Instructions Start Date   acetaminophen 325 MG tablet  Commonly known as: TYLENOL   650 mg, Oral, Every 6 Hours      ibuprofen 600 MG tablet  Commonly known as: ADVIL,MOTRIN   600 mg, Oral, Every 6 Hours      naloxone 4 MG/0.1ML nasal spray  Commonly known as: NARCAN   Call 911. Don't prime. [place] in 1 nostril for overdose. Repeat in 2-3 minutes in other nostril if no or minimal  breathing/responsiveness.      traMADol 50 MG tablet  Commonly known as: ULTRAM   50 mg, Oral, Every 6 Hours PRN             Continue These Medications        Instructions Start Date   buprenorphine 8 MG sublingual tablet SL tablet  Commonly known as: SUBUTEX   8 mg, Sublingual, Daily      clotrimazole 1 % cream  Commonly known as: LOTRIMIN   Apply 1 Application topically to the appropriate area as directed 2 (Two) Times a Day.      loratadine 10 MG tablet  Commonly known as: CLARITIN   1 tablet, Oral, Daily      Prenatal Vitamin 27-0.8 MG tablet   1 each, Oral, Daily               No Known Allergies    Discharge Disposition:   Home, self-care    Discharge Condition:  Good    Diet:   Regular    Discharge Activity:   Activity Instructions       Bathing Restrictions      Type of Restriction: Bathing    Bathing Restrictions:  No Tub Bath  Other       Explain Bathing Restrictions: SHOWERS ARE FINE    Driving Restrictions      Type of Restriction: Driving    Driving Restrictions: No Driving While Taking Narcotics    Lifting Restrictions      Type of Restriction: Lifting    Lifting Restrictions: Other    Explain Lifing Restrictions: NO LIFTING MORE THAN BABY AND ALL THINGS REQUIRED TO CARRY BABY AROUND STAIRS ARE FINE    Sexual Activity Restrictions      Type of Restriction: Sex    Explain Sexual Activity Restrictions: NONE FOR 6 WEEKS             Follow Up:  Future Appointments   Date Time Provider Department Center   6/17/2024 10:10 AM Maty Nascimento DO [facility]   7/11/2024  1:30 PM Maty Nascimento DO [facility]       Electronically signed by Maty Nascimento DO, 06/10/24, 5:30 AM EDT.

## 2024-06-10 NOTE — PLAN OF CARE
Problem: Adult Inpatient Plan of Care  Goal: Plan of Care Review  Outcome: Ongoing, Progressing  Goal: Patient-Specific Goal (Individualized)  Outcome: Ongoing, Progressing  Goal: Absence of Hospital-Acquired Illness or Injury  Outcome: Ongoing, Progressing  Intervention: Identify and Manage Fall Risk  Recent Flowsheet Documentation  Taken 6/10/2024 0200 by Lina Torres RN  Safety Promotion/Fall Prevention: safety round/check completed  Taken 6/10/2024 0000 by Lina Torres RN  Safety Promotion/Fall Prevention: safety round/check completed  Taken 2024 2200 by Lina Torres RN  Safety Promotion/Fall Prevention: safety round/check completed  Taken 2024 by Lina Torres RN  Safety Promotion/Fall Prevention: safety round/check completed  Intervention: Prevent Skin Injury  Recent Flowsheet Documentation  Taken 2024 by Lina Torres RN  Body Position: position changed independently  Intervention: Prevent and Manage VTE (Venous Thromboembolism) Risk  Recent Flowsheet Documentation  Taken 2024 by Lina Torres RN  Activity Management: up ad tunde  Goal: Optimal Comfort and Wellbeing  Outcome: Ongoing, Progressing  Intervention: Monitor Pain and Promote Comfort  Recent Flowsheet Documentation  Taken 2024 by Lina Torres RN  Pain Management Interventions:   care clustered   pain management plan reviewed with patient/caregiver   quiet environment facilitated  Intervention: Provide Person-Centered Care  Recent Flowsheet Documentation  Taken 2024 by Lina Torres RN  Trust Relationship/Rapport:   care explained   choices provided   questions answered   questions encouraged   thoughts/feelings acknowledged  Goal: Readiness for Transition of Care  Outcome: Ongoing, Progressing     Problem: Hypertension Comorbidity  Goal: Blood Pressure in Desired Range  Outcome: Ongoing, Progressing     Problem: Adjustment to Role Transition (Postpartum  Delivery)  Goal: Successful  Maternal Role Transition  Outcome: Ongoing, Progressing  Intervention: Support Maternal Role Transition  Recent Flowsheet Documentation  Taken 2024 by Lina Torres RN  Parent/Child Attachment Promotion:   caring behavior modeled   interaction encouraged   parent/caregiver presence encouraged   participation in care promoted   strengths emphasized     Problem: Bleeding (Postpartum  Delivery)  Goal: Hemostasis  Outcome: Ongoing, Progressing     Problem: Infection (Postpartum  Delivery)  Goal: Absence of Infection Signs and Symptoms  Outcome: Ongoing, Progressing     Problem: Pain (Postpartum  Delivery)  Goal: Acceptable Pain Control  Outcome: Ongoing, Progressing  Intervention: Prevent or Manage Pain  Recent Flowsheet Documentation  Taken 2024 by Lina Torres RN  Pain Management Interventions:   care clustered   pain management plan reviewed with patient/caregiver   quiet environment facilitated     Problem: Postoperative Nausea and Vomiting (Postpartum  Delivery)  Goal: Nausea and Vomiting Relief  Outcome: Ongoing, Progressing     Problem: Postoperative Urinary Retention (Postpartum  Delivery)  Goal: Effective Urinary Elimination  Outcome: Ongoing, Progressing  Intervention: Monitor and Manage Urinary Retention  Recent Flowsheet Documentation  Taken 2024 by Lina Torres RN  Urinary Elimination Promotion: frequent voiding encouraged   Goal Outcome Evaluation:      Ambulating and urinating well, continuing to monitor pain level and bleeding, fundus and incision WNL, progressing towards care plan goals. FS RN

## 2024-06-10 NOTE — PROGRESS NOTES
JUANCARLOS Antunez   PROGRESS NOTE    Post-Op Day 2 S/P     Subjective:  Patient has no complaints  Pain controlled  Tolerating a regular diet  Passing flatus  Ambulating  Urinating spontaneously  Lochia decreasing, no bleeding concerns  Denies HA, vision change, or RUQ/epigastric pain  No lightheadedness or dizziness    Objective    Objective:  Vital signs (most recent): Blood pressure 100/65, pulse 76, temperature 97.7 °F (36.5 °C), temperature source Oral, resp. rate 16, SpO2 98%, not currently breastfeeding.       Temp:  [97.7 °F (36.5 °C)-98.8 °F (37.1 °C)] 97.7 °F (36.5 °C)  Heart Rate:  [75-89] 76  Resp:  [16] 16  BP: (100-123)/(65-84) 100/65     Physical Exam:  GEN: alert and in no distress  Abdomen: fundus firm - below the umbilicus  abdomen soft + BS's  Incision: not dressed, clean, dry, and intact, healing well, no drainage, well approximated  ExtremiHoman's sign negative bilaterally, no redness or tenderness in the calves or thighsties:     Labs:  Lab Results (last 24 hours)       ** No results found for the last 24 hours. **               Assessment & Plan        PIH (pregnancy induced hypertension), third trimester    Arrest of descent, delivered, current hospitalization    Occiput posterior presentation of fetus    Non-reassuring fetal status, delivered, current hospitalization    Term birth of  female    Post-term pregnancy, 40-42 weeks of gestation    Assessment & Plan    Assessment:    Lina Bardales is Day 2  post-partum  , Low Transverse   .      Plan:    Routine postpartum/postop care     consult,     Pt to remain additional 24 hours to monitor baby due to pt on subutex for opioid use disorder    Maty Nascimento DO  06/10/24  09:03 EDT

## 2024-06-10 NOTE — PLAN OF CARE
Problem: Adult Inpatient Plan of Care  Goal: Plan of Care Review  Outcome: Ongoing, Progressing  Goal: Patient-Specific Goal (Individualized)  Outcome: Ongoing, Progressing  Goal: Absence of Hospital-Acquired Illness or Injury  Outcome: Ongoing, Progressing  Intervention: Identify and Manage Fall Risk  Recent Flowsheet Documentation  Taken 6/10/2024 1600 by Charis Chaudhari RN  Safety Promotion/Fall Prevention: safety round/check completed  Taken 6/10/2024 1400 by Charis Chaudhari RN  Safety Promotion/Fall Prevention: safety round/check completed  Taken 6/10/2024 1300 by Charis Chaudhari RN  Safety Promotion/Fall Prevention: safety round/check completed  Taken 6/10/2024 1200 by Charis Chaudhari RN  Safety Promotion/Fall Prevention: safety round/check completed  Taken 6/10/2024 1100 by Charis Chaudhari RN  Safety Promotion/Fall Prevention: safety round/check completed  Taken 6/10/2024 1000 by Charis Chaudhari RN  Safety Promotion/Fall Prevention: safety round/check completed  Taken 6/10/2024 0930 by Charis Chaudhari RN  Safety Promotion/Fall Prevention: safety round/check completed  Taken 6/10/2024 0830 by Charis Chaudhari RN  Safety Promotion/Fall Prevention: safety round/check completed  Taken 6/10/2024 0730 by Charis Chaudhari RN  Safety Promotion/Fall Prevention: safety round/check completed  Intervention: Prevent and Manage VTE (Venous Thromboembolism) Risk  Recent Flowsheet Documentation  Taken 6/10/2024 0930 by Charis Chaudhari RN  Activity Management: up ad tunde  Goal: Optimal Comfort and Wellbeing  Outcome: Ongoing, Progressing  Intervention: Provide Person-Centered Care  Recent Flowsheet Documentation  Taken 6/10/2024 0930 by Charis Chaudhari RN  Trust Relationship/Rapport:   thoughts/feelings acknowledged   reassurance provided   questions answered   questions encouraged   empathic listening provided   emotional support provided   choices provided   care explained  Goal: Readiness for Transition of  Care  Outcome: Ongoing, Progressing     Problem: Hypertension Comorbidity  Goal: Blood Pressure in Desired Range  Outcome: Ongoing, Progressing     Problem: Adjustment to Role Transition (Postpartum  Delivery)  Goal: Successful Maternal Role Transition  Outcome: Ongoing, Progressing     Problem: Bleeding (Postpartum  Delivery)  Goal: Hemostasis  Outcome: Ongoing, Progressing     Problem: Infection (Postpartum  Delivery)  Goal: Absence of Infection Signs and Symptoms  Outcome: Ongoing, Progressing     Problem: Pain (Postpartum  Delivery)  Goal: Acceptable Pain Control  Outcome: Ongoing, Progressing     Problem: Postoperative Nausea and Vomiting (Postpartum  Delivery)  Goal: Nausea and Vomiting Relief  Outcome: Ongoing, Progressing     Problem: Postoperative Urinary Retention (Postpartum  Delivery)  Goal: Effective Urinary Elimination  Outcome: Ongoing, Progressing   Goal Outcome Evaluation:

## 2024-06-11 VITALS
HEART RATE: 77 BPM | TEMPERATURE: 98.2 F | DIASTOLIC BLOOD PRESSURE: 64 MMHG | RESPIRATION RATE: 18 BRPM | OXYGEN SATURATION: 98 % | SYSTOLIC BLOOD PRESSURE: 98 MMHG

## 2024-06-11 LAB
CYTO UR: NORMAL
LAB AP CASE REPORT: NORMAL
LAB AP CLINICAL INFORMATION: NORMAL
PATH REPORT.FINAL DX SPEC: NORMAL
PATH REPORT.GROSS SPEC: NORMAL

## 2024-06-11 PROCEDURE — 0503F POSTPARTUM CARE VISIT: CPT | Performed by: OBSTETRICS & GYNECOLOGY

## 2024-06-11 RX ORDER — IBUPROFEN 600 MG/1
600 TABLET ORAL EVERY 6 HOURS
Qty: 30 TABLET | Refills: 1 | Status: SHIPPED | OUTPATIENT
Start: 2024-06-11

## 2024-06-11 RX ORDER — NALOXONE HYDROCHLORIDE 4 MG/.1ML
SPRAY NASAL
Qty: 2 EACH | Refills: 0 | Status: SHIPPED | OUTPATIENT
Start: 2024-06-11

## 2024-06-11 RX ORDER — TRAMADOL HYDROCHLORIDE 50 MG/1
50 TABLET ORAL EVERY 6 HOURS PRN
Qty: 12 TABLET | Refills: 0 | Status: SHIPPED | OUTPATIENT
Start: 2024-06-11

## 2024-06-11 RX ORDER — ACETAMINOPHEN 325 MG/1
650 TABLET ORAL EVERY 6 HOURS
Qty: 30 TABLET | Refills: 1 | Status: SHIPPED | OUTPATIENT
Start: 2024-06-11

## 2024-06-11 RX ADMIN — IBUPROFEN 600 MG: 600 TABLET, FILM COATED ORAL at 12:13

## 2024-06-11 RX ADMIN — ACETAMINOPHEN 650 MG: 325 TABLET ORAL at 04:33

## 2024-06-11 RX ADMIN — ACETAMINOPHEN 650 MG: 325 TABLET ORAL at 10:11

## 2024-06-11 RX ADMIN — IBUPROFEN 600 MG: 600 TABLET, FILM COATED ORAL at 00:22

## 2024-06-11 RX ADMIN — IBUPROFEN 600 MG: 600 TABLET, FILM COATED ORAL at 17:43

## 2024-06-11 RX ADMIN — MAGNESIUM HYDROXIDE 5 ML: 2400 SUSPENSION ORAL at 00:22

## 2024-06-11 RX ADMIN — BUPRENORPHINE 8 MG: 8 TABLET SUBLINGUAL at 09:02

## 2024-06-11 RX ADMIN — ACETAMINOPHEN 650 MG: 325 TABLET ORAL at 15:56

## 2024-06-11 RX ADMIN — IBUPROFEN 600 MG: 600 TABLET, FILM COATED ORAL at 05:58

## 2024-06-11 NOTE — PLAN OF CARE
Problem: Adult Inpatient Plan of Care  Goal: Plan of Care Review  Outcome: Ongoing, Progressing  Goal: Patient-Specific Goal (Individualized)  Outcome: Ongoing, Progressing  Goal: Absence of Hospital-Acquired Illness or Injury  Outcome: Ongoing, Progressing  Intervention: Identify and Manage Fall Risk  Recent Flowsheet Documentation  Taken 6/10/2024 2029 by Constance Ojeda RN  Safety Promotion/Fall Prevention:   assistive device/personal items within reach   clutter free environment maintained   nonskid shoes/slippers when out of bed   room organization consistent   safety round/check completed  Intervention: Prevent Skin Injury  Recent Flowsheet Documentation  Taken 6/10/2024 2029 by Constance Ojeda RN  Body Position: position changed independently  Intervention: Prevent and Manage VTE (Venous Thromboembolism) Risk  Recent Flowsheet Documentation  Taken 6/10/2024 2029 by Constance Ojeda RN  Activity Management: up ad tunde  VTE Prevention/Management:   bilateral   sequential compression devices off   patient refused intervention  Goal: Optimal Comfort and Wellbeing  Outcome: Ongoing, Progressing  Intervention: Monitor Pain and Promote Comfort  Recent Flowsheet Documentation  Taken 6/10/2024 2029 by Constance Ojeda RN  Pain Management Interventions:   awakened for pain meds per patient request   around-the-clock dosing utilized   care clustered   heat applied   pain management plan reviewed with patient/caregiver   see MAR   quiet environment facilitated  Intervention: Provide Person-Centered Care  Recent Flowsheet Documentation  Taken 6/10/2024 2029 by Constanec Ojeda RN  Trust Relationship/Rapport:   care explained   choices provided   emotional support provided   empathic listening provided   questions answered   questions encouraged   reassurance provided   thoughts/feelings acknowledged  Goal: Readiness for Transition of Care  Outcome: Ongoing, Progressing     Problem: Hypertension Comorbidity  Goal: Blood  Pressure in Desired Range  Outcome: Ongoing, Progressing     Problem: Adjustment to Role Transition (Postpartum  Delivery)  Goal: Successful Maternal Role Transition  Outcome: Ongoing, Progressing     Problem: Bleeding (Postpartum  Delivery)  Goal: Hemostasis  Outcome: Ongoing, Progressing     Problem: Infection (Postpartum  Delivery)  Goal: Absence of Infection Signs and Symptoms  Outcome: Ongoing, Progressing     Problem: Pain (Postpartum  Delivery)  Goal: Acceptable Pain Control  Outcome: Ongoing, Progressing  Intervention: Prevent or Manage Pain  Recent Flowsheet Documentation  Taken 6/10/2024 2029 by Constance Ojeda RN  Pain Management Interventions:   awakened for pain meds per patient request   around-the-clock dosing utilized   care clustered   heat applied   pain management plan reviewed with patient/caregiver   see MAR   quiet environment facilitated     Problem: Postoperative Nausea and Vomiting (Postpartum  Delivery)  Goal: Nausea and Vomiting Relief  Outcome: Ongoing, Progressing     Problem: Postoperative Urinary Retention (Postpartum  Delivery)  Goal: Effective Urinary Elimination  Outcome: Ongoing, Progressing   Goal Outcome Evaluation:

## 2024-06-11 NOTE — PROGRESS NOTES
JUANCARLOS Antunez   PROGRESS NOTE    Post-Op Day 3 S/P     Subjective:  Patient has no complaints  Pain controlled  Tolerating a regular diet  Passing flatus  Ambulating  Urinating spontaneously  Lochia decreasing, no bleeding concerns  Denies HA, vision change, or RUQ/epigastric pain  No lightheadedness or dizziness  Desires DC home if baby Dc'd    Objective    Objective:  Vital signs (most recent): Blood pressure 127/83, pulse 76, temperature 98 °F (36.7 °C), temperature source Oral, resp. rate 17, SpO2 98%, not currently breastfeeding.       Temp:  [98 °F (36.7 °C)-98.2 °F (36.8 °C)] 98 °F (36.7 °C)  Heart Rate:  [57-95] 76  Resp:  [16-17] 17  BP: (102-127)/(65-87) 127/83     Physical Exam:  GEN: alert and in no distress  Abdomen: fundus firm - below the umbilicus  abdomen soft + BS's  Incision: not dressed, clean, dry, and intact, healing well, no drainage, no hernia, no swelling, well approximated  Extremities: Derik's sign negative bilaterally, no redness or tenderness in the calves or thighs    Labs:  Lab Results (last 24 hours)       ** No results found for the last 24 hours. **               Assessment & Plan        PIH (pregnancy induced hypertension), third trimester    Arrest of descent, delivered, current hospitalization    Occiput posterior presentation of fetus    Non-reassuring fetal status, delivered, current hospitalization    Term birth of  female    Post-term pregnancy, 40-42 weeks of gestation    Assessment & Plan    Assessment:    Lina Bardales is Day 3  post-partum  , Low Transverse   .      Plan:    Routine postpartum/postop care    PO pain meds, Importance of wound care/keep clean and dry, Discharge home, DC meds reviewed, Follow up scheduled,  consult        Maty Nascimento DO  24  14:42 EDT

## 2024-06-11 NOTE — PLAN OF CARE
Goal Outcome Evaluation:  Plan of Care Reviewed With: patient, spouse           Outcome Evaluation: VSS 9 no elevated blood pressure. Lochia scant.Fundus minus. Incision clean and dry and approximated. Incisional care instruction given to patient and S/O. Verbalized understanding. Performing infant care without assistance. Infant will remain at the hospital for observation due to mom being on subutex. RAGHAVENDRA have been low. Patient states she is ready to go home and will room in until infant is released. ocial services have been in to evaluate parents. Infant will be discharged to parents on infant's discharge in approximately two days.

## 2024-07-01 ENCOUNTER — TELEPHONE (OUTPATIENT)
Dept: OBSTETRICS AND GYNECOLOGY | Facility: CLINIC | Age: 34
End: 2024-07-01

## 2024-07-15 ENCOUNTER — TELEPHONE (OUTPATIENT)
Dept: OBSTETRICS AND GYNECOLOGY | Facility: CLINIC | Age: 34
End: 2024-07-15
Payer: COMMERCIAL
